# Patient Record
Sex: FEMALE | Race: WHITE | NOT HISPANIC OR LATINO | Employment: UNEMPLOYED | URBAN - NONMETROPOLITAN AREA
[De-identification: names, ages, dates, MRNs, and addresses within clinical notes are randomized per-mention and may not be internally consistent; named-entity substitution may affect disease eponyms.]

---

## 2023-03-06 ENCOUNTER — TELEPHONE (OUTPATIENT)
Dept: PRIMARY CARE | Facility: CLINIC | Age: 63
End: 2023-03-06

## 2023-03-06 DIAGNOSIS — I10 BENIGN HYPERTENSION: ICD-10-CM

## 2023-03-06 RX ORDER — VALSARTAN AND HYDROCHLOROTHIAZIDE 160; 12.5 MG/1; MG/1
1 TABLET, FILM COATED ORAL DAILY
COMMUNITY
Start: 2020-07-14 | End: 2023-03-29 | Stop reason: SDUPTHER

## 2023-03-06 RX ORDER — AMLODIPINE BESYLATE 10 MG/1
1 TABLET ORAL DAILY
COMMUNITY
Start: 2022-01-05 | End: 2023-03-06 | Stop reason: SDUPTHER

## 2023-03-06 RX ORDER — PANTOPRAZOLE SODIUM 40 MG/1
1 TABLET, DELAYED RELEASE ORAL DAILY
COMMUNITY
Start: 2015-09-03 | End: 2023-05-08 | Stop reason: SDUPTHER

## 2023-03-06 RX ORDER — SERTRALINE HYDROCHLORIDE 100 MG/1
TABLET, FILM COATED ORAL
COMMUNITY
Start: 2018-01-25 | End: 2023-07-12 | Stop reason: SDUPTHER

## 2023-03-06 RX ORDER — ASPIRIN 81 MG/1
1 TABLET ORAL DAILY
COMMUNITY
Start: 2022-01-05

## 2023-03-06 RX ORDER — CHOLECALCIFEROL (VITAMIN D3) 125 MCG
CAPSULE ORAL
COMMUNITY
Start: 2016-02-04

## 2023-03-06 RX ORDER — ROSUVASTATIN CALCIUM 40 MG/1
1 TABLET, COATED ORAL DAILY
COMMUNITY
Start: 2019-05-24 | End: 2023-03-29 | Stop reason: SDUPTHER

## 2023-03-06 RX ORDER — METOPROLOL SUCCINATE 25 MG/1
1 TABLET, EXTENDED RELEASE ORAL DAILY
COMMUNITY
Start: 2017-12-02 | End: 2023-05-08 | Stop reason: SDUPTHER

## 2023-03-06 RX ORDER — FLUTICASONE PROPIONATE 50 MCG
SPRAY, SUSPENSION (ML) NASAL
COMMUNITY
Start: 2019-09-04 | End: 2023-05-08 | Stop reason: SDUPTHER

## 2023-03-07 RX ORDER — AMLODIPINE BESYLATE 10 MG/1
10 TABLET ORAL DAILY
Qty: 30 TABLET | Refills: 2 | Status: SHIPPED | OUTPATIENT
Start: 2023-03-07 | End: 2023-07-12 | Stop reason: SDUPTHER

## 2023-03-29 DIAGNOSIS — E78.00 HYPERCHOLESTEROLEMIA: ICD-10-CM

## 2023-03-29 DIAGNOSIS — I10 BENIGN HYPERTENSION: Primary | ICD-10-CM

## 2023-03-29 RX ORDER — VALSARTAN AND HYDROCHLOROTHIAZIDE 160; 12.5 MG/1; MG/1
1 TABLET, FILM COATED ORAL DAILY
Qty: 90 TABLET | Refills: 0 | Status: SHIPPED | OUTPATIENT
Start: 2023-03-29 | End: 2023-07-12 | Stop reason: SDUPTHER

## 2023-03-29 RX ORDER — ROSUVASTATIN CALCIUM 40 MG/1
40 TABLET, COATED ORAL DAILY
Qty: 90 TABLET | Refills: 0 | Status: SHIPPED | OUTPATIENT
Start: 2023-03-29 | End: 2023-07-12 | Stop reason: SDUPTHER

## 2023-05-05 PROBLEM — I10 BENIGN ESSENTIAL HYPERTENSION: Status: ACTIVE | Noted: 2023-05-05

## 2023-05-05 PROBLEM — S99.929A FOOT TRAUMA: Status: ACTIVE | Noted: 2023-05-05

## 2023-05-05 PROBLEM — K64.8 INTERNAL HEMORRHOIDS: Status: ACTIVE | Noted: 2023-05-05

## 2023-05-05 PROBLEM — Z20.822 ENCOUNTER BY TELEHEALTH FOR SUSPECTED COVID-19: Status: ACTIVE | Noted: 2023-05-05

## 2023-05-05 PROBLEM — R94.31 ABNORMAL EKG: Status: ACTIVE | Noted: 2023-05-05

## 2023-05-05 PROBLEM — M19.90 OSTEOARTHRITIS: Status: ACTIVE | Noted: 2023-05-05

## 2023-05-05 PROBLEM — K21.9 GERD (GASTROESOPHAGEAL REFLUX DISEASE): Status: ACTIVE | Noted: 2023-05-05

## 2023-05-05 PROBLEM — I10 HTN (HYPERTENSION): Status: ACTIVE | Noted: 2023-05-05

## 2023-05-05 PROBLEM — K80.20 CHOLELITHIASIS: Status: ACTIVE | Noted: 2023-05-05

## 2023-05-05 PROBLEM — H92.02 LEFT EAR PAIN: Status: ACTIVE | Noted: 2023-05-05

## 2023-05-05 PROBLEM — K64.9 HEMORRHOIDS: Status: ACTIVE | Noted: 2023-05-05

## 2023-05-05 PROBLEM — N95.2 ATROPHIC VAGINITIS: Status: ACTIVE | Noted: 2023-05-05

## 2023-05-05 PROBLEM — I21.9 MYOCARDIAL INFARCTION (MULTI): Status: ACTIVE | Noted: 2023-05-05

## 2023-05-05 PROBLEM — J20.9 ACUTE BRONCHITIS: Status: ACTIVE | Noted: 2023-05-05

## 2023-05-05 PROBLEM — I25.10 CAD (CORONARY ARTERY DISEASE): Status: ACTIVE | Noted: 2023-05-05

## 2023-05-05 PROBLEM — E55.9 VITAMIN D DEFICIENCY: Status: ACTIVE | Noted: 2023-05-05

## 2023-05-05 PROBLEM — K57.32 DIVERTICULITIS OF COLON: Status: ACTIVE | Noted: 2023-05-05

## 2023-05-05 PROBLEM — D50.9 IRON DEFICIENCY ANEMIA: Status: ACTIVE | Noted: 2023-05-05

## 2023-05-05 PROBLEM — B37.31 VAGINAL CANDIDIASIS: Status: ACTIVE | Noted: 2023-05-05

## 2023-05-05 PROBLEM — F11.90 NARCOTIC DRUG USE: Status: ACTIVE | Noted: 2023-05-05

## 2023-05-05 PROBLEM — D50.0 IRON DEFICIENCY ANEMIA DUE TO CHRONIC BLOOD LOSS: Status: ACTIVE | Noted: 2023-05-05

## 2023-05-05 PROBLEM — M19.90 ARTHRITIS: Status: ACTIVE | Noted: 2023-05-05

## 2023-05-05 PROBLEM — U07.1 COVID-19 VIRUS INFECTION: Status: ACTIVE | Noted: 2023-05-05

## 2023-05-05 PROBLEM — H66.90 ACUTE OTITIS MEDIA: Status: ACTIVE | Noted: 2023-05-05

## 2023-05-05 PROBLEM — L30.9 DERMATITIS: Status: ACTIVE | Noted: 2023-05-05

## 2023-05-05 PROBLEM — R73.9 HYPERGLYCEMIA: Status: ACTIVE | Noted: 2023-05-05

## 2023-05-05 PROBLEM — F41.9 ANXIETY: Status: ACTIVE | Noted: 2023-05-05

## 2023-05-05 PROBLEM — J02.9 SORE THROAT: Status: ACTIVE | Noted: 2023-05-05

## 2023-05-05 PROBLEM — N95.1 HOT FLASH, MENOPAUSAL: Status: ACTIVE | Noted: 2023-05-05

## 2023-05-05 PROBLEM — M81.0 POST-MENOPAUSAL OSTEOPOROSIS: Status: ACTIVE | Noted: 2023-05-05

## 2023-05-05 PROBLEM — R73.03 BORDERLINE DIABETES: Status: ACTIVE | Noted: 2023-05-05

## 2023-05-05 PROBLEM — E83.42 HYPOMAGNESEMIA: Status: ACTIVE | Noted: 2023-05-05

## 2023-05-05 PROBLEM — J02.9 ACUTE PHARYNGITIS: Status: ACTIVE | Noted: 2023-05-05

## 2023-05-05 PROBLEM — K59.00 CONSTIPATION: Status: ACTIVE | Noted: 2023-05-05

## 2023-05-05 PROBLEM — J01.00 ACUTE MAXILLARY SINUSITIS: Status: ACTIVE | Noted: 2023-05-05

## 2023-05-05 PROBLEM — F32.A DEPRESSION: Status: ACTIVE | Noted: 2023-05-05

## 2023-05-05 PROBLEM — N94.10 UNSPECIFIED DYSPAREUNIA: Status: ACTIVE | Noted: 2023-05-05

## 2023-05-05 PROBLEM — J30.9 ALLERGIC RHINITIS: Status: ACTIVE | Noted: 2023-05-05

## 2023-05-05 PROBLEM — S92.919A TOE FRACTURE: Status: ACTIVE | Noted: 2023-05-05

## 2023-05-05 PROBLEM — E66.9 OBESITY (BMI 30-39.9): Status: ACTIVE | Noted: 2023-05-05

## 2023-05-05 PROBLEM — E78.00 HYPERCHOLESTEROLEMIA: Status: ACTIVE | Noted: 2023-05-05

## 2023-05-05 PROBLEM — J01.10 ACUTE FRONTAL SINUSITIS: Status: ACTIVE | Noted: 2023-05-05

## 2023-05-05 PROBLEM — L27.0 DRUG RASH: Status: ACTIVE | Noted: 2023-05-05

## 2023-05-08 ENCOUNTER — OFFICE VISIT (OUTPATIENT)
Dept: PRIMARY CARE | Facility: CLINIC | Age: 63
End: 2023-05-08

## 2023-05-08 VITALS
BODY MASS INDEX: 28.03 KG/M2 | WEIGHT: 138.8 LBS | HEART RATE: 68 BPM | OXYGEN SATURATION: 98 % | SYSTOLIC BLOOD PRESSURE: 120 MMHG | DIASTOLIC BLOOD PRESSURE: 68 MMHG

## 2023-05-08 DIAGNOSIS — J30.9 ALLERGIC RHINITIS, UNSPECIFIED SEASONALITY, UNSPECIFIED TRIGGER: ICD-10-CM

## 2023-05-08 DIAGNOSIS — I10 BENIGN ESSENTIAL HYPERTENSION: ICD-10-CM

## 2023-05-08 DIAGNOSIS — K21.9 GASTROESOPHAGEAL REFLUX DISEASE, UNSPECIFIED WHETHER ESOPHAGITIS PRESENT: ICD-10-CM

## 2023-05-08 DIAGNOSIS — J01.90 ACUTE NON-RECURRENT SINUSITIS, UNSPECIFIED LOCATION: Primary | ICD-10-CM

## 2023-05-08 DIAGNOSIS — F32.A DEPRESSION, UNSPECIFIED DEPRESSION TYPE: ICD-10-CM

## 2023-05-08 DIAGNOSIS — I25.10 CORONARY ARTERY DISEASE INVOLVING NATIVE CORONARY ARTERY OF NATIVE HEART WITHOUT ANGINA PECTORIS: ICD-10-CM

## 2023-05-08 PROCEDURE — 3078F DIAST BP <80 MM HG: CPT | Performed by: INTERNAL MEDICINE

## 2023-05-08 PROCEDURE — 3008F BODY MASS INDEX DOCD: CPT | Performed by: INTERNAL MEDICINE

## 2023-05-08 PROCEDURE — 1036F TOBACCO NON-USER: CPT | Performed by: INTERNAL MEDICINE

## 2023-05-08 PROCEDURE — 99214 OFFICE O/P EST MOD 30 MIN: CPT | Performed by: INTERNAL MEDICINE

## 2023-05-08 PROCEDURE — 3074F SYST BP LT 130 MM HG: CPT | Performed by: INTERNAL MEDICINE

## 2023-05-08 RX ORDER — METOPROLOL SUCCINATE 25 MG/1
25 TABLET, EXTENDED RELEASE ORAL DAILY
Qty: 90 TABLET | Refills: 0 | Status: SHIPPED | OUTPATIENT
Start: 2023-05-08 | End: 2023-08-08 | Stop reason: SDUPTHER

## 2023-05-08 RX ORDER — LORATADINE 10 MG/1
10 TABLET ORAL DAILY
COMMUNITY

## 2023-05-08 RX ORDER — FLUTICASONE PROPIONATE 50 MCG
1 SPRAY, SUSPENSION (ML) NASAL DAILY
Qty: 16 G | Refills: 2 | Status: SHIPPED | OUTPATIENT
Start: 2023-05-08 | End: 2023-11-01 | Stop reason: SDUPTHER

## 2023-05-08 RX ORDER — AMOXICILLIN AND CLAVULANATE POTASSIUM 875; 125 MG/1; MG/1
875 TABLET, FILM COATED ORAL 2 TIMES DAILY
Qty: 20 TABLET | Refills: 0 | Status: SHIPPED | OUTPATIENT
Start: 2023-05-08 | End: 2023-05-18

## 2023-05-08 RX ORDER — PANTOPRAZOLE SODIUM 40 MG/1
40 TABLET, DELAYED RELEASE ORAL DAILY
Qty: 90 TABLET | Refills: 0 | Status: SHIPPED | OUTPATIENT
Start: 2023-05-08 | End: 2023-08-08 | Stop reason: SDUPTHER

## 2023-05-08 NOTE — PROGRESS NOTES
Subjective   Patient ID: Livia Jordan is a 62 y.o. female who presents for Hypertension, Anxiety, GERD, Hyperlipidemia, and Earache (Left ear pressure, blocked, headache, cough, sinus drainage x 1 1/2 wks).  HPI      Patient here for sick visit.      She complained of sinus pressure with discolored drainage as well as left ear pressure and       feeling blocked with a headache and cough x1-1/2 weeks.  She denied fevers or chills.        She denied shortness of breath and chest pain.     depression stable on rx no side effects   In the process of  from her spouse and moving in with her daughter.     hypertension stable on rx no side effects     h/o gallstones occasional pain     Irritated hemorrhoids and bloating. better on rx     MI,S/P PTCA with stent, arrhythmia / history of cardiac arrest stable on rx  sees cardio     arthritis needs tramadol prn     cholesterol stable on rx no side effects     GERD stable on rx no side effects     mammogram, dexa not done yet  labs ordered not done     GYN on follow up    Review of Systems   All other systems reviewed and are negative.      Objective   /68   Pulse 68   Wt 63 kg (138 lb 12.8 oz)   SpO2 98%   BMI 28.03 kg/m²   Lab Results   Component Value Date    WBC 8.1 12/11/2017    HGB 13.8 12/11/2017    HCT 39.0 12/11/2017     12/11/2017    CHOL 168 01/22/2018    TRIG 118 01/22/2018    HDL 57.3 01/22/2018    ALT 39 01/22/2018    AST 22 01/22/2018     01/22/2018    K 4.1 01/22/2018     01/22/2018    CREATININE 0.73 01/22/2018    BUN 21 01/22/2018    CO2 33 (H) 01/22/2018    INR 1.0 11/30/2017           Physical Exam  Vitals reviewed.   Constitutional:       Appearance: Normal appearance. She is normal weight.   HENT:      Head: Normocephalic and atraumatic.      Right Ear: Tympanic membrane normal.      Left Ear: Tympanic membrane normal.      Mouth/Throat:      Pharynx: No posterior oropharyngeal erythema.   Eyes:      General: No  scleral icterus.     Conjunctiva/sclera: Conjunctivae normal.      Pupils: Pupils are equal, round, and reactive to light.   Cardiovascular:      Rate and Rhythm: Normal rate and regular rhythm.      Heart sounds: Normal heart sounds.   Pulmonary:      Effort: Pulmonary effort is normal. No respiratory distress.      Breath sounds: No wheezing.   Abdominal:      General: Abdomen is flat. Bowel sounds are normal. There is no distension.      Palpations: Abdomen is soft. There is no mass.      Tenderness: There is no abdominal tenderness. There is no rebound.   Musculoskeletal:         General: Normal range of motion.      Cervical back: Normal range of motion and neck supple.   Skin:     General: Skin is warm and dry.   Neurological:      General: No focal deficit present.      Mental Status: She is alert and oriented to person, place, and time. Mental status is at baseline.   Psychiatric:         Mood and Affect: Mood normal.         Behavior: Behavior normal.         Thought Content: Thought content normal.         Judgment: Judgment normal.         Problem List Items Addressed This Visit          Circulatory    Benign essential hypertension    Relevant Medications    metoprolol succinate XL (Toprol-XL) 25 mg 24 hr tablet    CAD (coronary artery disease)    Relevant Medications    metoprolol succinate XL (Toprol-XL) 25 mg 24 hr tablet       Digestive    GERD (gastroesophageal reflux disease)    Relevant Medications    pantoprazole (ProtoNix) 40 mg EC tablet       Other    Allergic rhinitis    Relevant Medications    fluticasone (Flonase) 50 mcg/actuation nasal spray    Depression     Other Visit Diagnoses       Acute non-recurrent sinusitis, unspecified location    -  Primary    Relevant Medications    amoxicillin-pot clavulanate (Augmentin) 875-125 mg tablet    BMI 28.0-28.9,adult              Assessment/Plan       She complained of sinus pressure with discolored drainage as well as left ear pressure and        feeling blocked with a headache and cough x1-1/2 weeks.  She denied fevers or chills.        She denied shortness of breath and chest pain.    Acute sinusitis with  eustachian tube dysfunction  Augmentin 875 mg twice daily #20 no refill risk/benefits reviewed.  Flonase as directed as needed  Rest increase hydration recommended.    depression stable on rx no side effects   In the process of  from her spouse and moving in with her daughter.    hypertension stable on rx no side effects     h/o gallstones occasional pain     Irritated hemorrhoids and bloating. better on rx     MI,S/P PTCA with stent, arrhythmia / history of cardiac arrest stable on rx  sees cardio     arthritis needs tramadol prn     cholesterol stable on rx no side effects     GERD stable on rx no side effects    Diet and exercise reviewed     do mammogram, dexa not done yet  labs ordered     GYN on follow up    Follow-up 3 months

## 2023-06-14 ENCOUNTER — OFFICE VISIT (OUTPATIENT)
Dept: PRIMARY CARE | Facility: CLINIC | Age: 63
End: 2023-06-14

## 2023-06-14 VITALS
HEART RATE: 57 BPM | OXYGEN SATURATION: 97 % | WEIGHT: 134.6 LBS | TEMPERATURE: 96.7 F | DIASTOLIC BLOOD PRESSURE: 78 MMHG | SYSTOLIC BLOOD PRESSURE: 122 MMHG | BODY MASS INDEX: 27.19 KG/M2

## 2023-06-14 DIAGNOSIS — I10 BENIGN ESSENTIAL HYPERTENSION: ICD-10-CM

## 2023-06-14 DIAGNOSIS — J01.91 ACUTE RECURRENT SINUSITIS, UNSPECIFIED LOCATION: Primary | ICD-10-CM

## 2023-06-14 DIAGNOSIS — B37.31 CANDIDAL VULVOVAGINITIS: ICD-10-CM

## 2023-06-14 DIAGNOSIS — E78.00 HYPERCHOLESTEROLEMIA: ICD-10-CM

## 2023-06-14 DIAGNOSIS — K21.9 GASTROESOPHAGEAL REFLUX DISEASE WITHOUT ESOPHAGITIS: ICD-10-CM

## 2023-06-14 DIAGNOSIS — F32.A DEPRESSION, UNSPECIFIED DEPRESSION TYPE: ICD-10-CM

## 2023-06-14 DIAGNOSIS — I25.10 CORONARY ARTERY DISEASE INVOLVING NATIVE CORONARY ARTERY OF NATIVE HEART WITHOUT ANGINA PECTORIS: ICD-10-CM

## 2023-06-14 PROCEDURE — 99214 OFFICE O/P EST MOD 30 MIN: CPT | Performed by: INTERNAL MEDICINE

## 2023-06-14 PROCEDURE — 3078F DIAST BP <80 MM HG: CPT | Performed by: INTERNAL MEDICINE

## 2023-06-14 PROCEDURE — 3074F SYST BP LT 130 MM HG: CPT | Performed by: INTERNAL MEDICINE

## 2023-06-14 PROCEDURE — 1036F TOBACCO NON-USER: CPT | Performed by: INTERNAL MEDICINE

## 2023-06-14 PROCEDURE — 3008F BODY MASS INDEX DOCD: CPT | Performed by: INTERNAL MEDICINE

## 2023-06-14 RX ORDER — DOXYCYCLINE HYCLATE 100 MG
100 TABLET ORAL 2 TIMES DAILY
Qty: 20 TABLET | Refills: 0 | Status: SHIPPED | OUTPATIENT
Start: 2023-06-14 | End: 2023-06-24

## 2023-06-14 RX ORDER — FLUCONAZOLE 150 MG/1
150 TABLET ORAL ONCE
Qty: 1 TABLET | Refills: 0 | Status: SHIPPED | OUTPATIENT
Start: 2023-06-14 | End: 2023-06-14

## 2023-06-14 NOTE — PROGRESS NOTES
Subjective   Patient ID: Livia Jordan is a 62 y.o. female who presents for Sinus Problem, Pressure Behind the Eyes, Earache, Sore Throat (raspy), and Fatigue.  HPI    Acute visit    Patient came in today complaining of sinus pressure particularly behind the eyes along with earache sore throat and fatigue.  She denies any fevers or chills.  She denies any cough as well as shortness of breath.  This has been going on for some time now.  She has used over-the-counter medications with no significant improvement.  She states that following her last infection she did well for a week before her symptoms reoccurred.    depression stable on rx no side effects    from her spouse and moved in with her daughter.     hypertension stable on rx no side effects     h/o gallstones occasional pain     Irritated hemorrhoids and bloating. better on rx     MI,S/P PTCA with stent, arrhythmia / history of cardiac arrest stable on rx  sees cardio     arthritis needs tramadol prn     cholesterol stable on rx no side effects     GERD stable on rx no side effects     Diet and exercise reviewed     do mammogram, dexa not done yet  labs ordered     GYN on follow up    Review of Systems   All other systems reviewed and are negative.      Objective   /78   Pulse 57   Temp 35.9 °C (96.7 °F)   Wt 61.1 kg (134 lb 9.6 oz)   SpO2 97%   BMI 27.19 kg/m²   Lab Results   Component Value Date    WBC 8.1 12/11/2017    HGB 13.8 12/11/2017    HCT 39.0 12/11/2017     12/11/2017    CHOL 168 01/22/2018    TRIG 118 01/22/2018    HDL 57.3 01/22/2018    ALT 39 01/22/2018    AST 22 01/22/2018     01/22/2018    K 4.1 01/22/2018     01/22/2018    CREATININE 0.73 01/22/2018    BUN 21 01/22/2018    CO2 33 (H) 01/22/2018    INR 1.0 11/30/2017           Physical Exam  Vitals reviewed.   Constitutional:       Appearance: Normal appearance. She is normal weight.   HENT:      Head: Normocephalic and atraumatic.      Ears:       Comments: B/l ears red     Mouth/Throat:      Pharynx: No posterior oropharyngeal erythema.   Eyes:      General: No scleral icterus.     Conjunctiva/sclera: Conjunctivae normal.      Pupils: Pupils are equal, round, and reactive to light.   Cardiovascular:      Rate and Rhythm: Normal rate and regular rhythm.      Heart sounds: Normal heart sounds.   Pulmonary:      Effort: No respiratory distress.      Breath sounds: No wheezing.   Abdominal:      General: Abdomen is flat. Bowel sounds are normal. There is no distension.      Palpations: Abdomen is soft. There is no mass.      Tenderness: There is no abdominal tenderness. There is no rebound.   Musculoskeletal:         General: Normal range of motion.      Cervical back: Normal range of motion and neck supple.   Skin:     General: Skin is warm and dry.   Neurological:      General: No focal deficit present.      Mental Status: She is alert and oriented to person, place, and time. Mental status is at baseline.   Psychiatric:         Mood and Affect: Mood normal.         Behavior: Behavior normal.         Thought Content: Thought content normal.         Judgment: Judgment normal.         Problem List Items Addressed This Visit          Circulatory    Benign essential hypertension    CAD (coronary artery disease)       Digestive    GERD (gastroesophageal reflux disease)       Other    Depression    Hypercholesterolemia     Other Visit Diagnoses       Acute recurrent sinusitis, unspecified location    -  Primary    Relevant Medications    doxycycline (Vibra-Tabs) 100 mg tablet    Candidal vulvovaginitis        Relevant Medications    fluconazole (Diflucan) 150 mg tablet    BMI 27.0-27.9,adult              Assessment/Plan       Patient came in today complaining of sinus pressure particularly behind the eyes along with earache sore throat and fatigue.  She denies any fevers or chills.  She denies any cough as well as shortness of breath.  This has been going on for some  time now.  She has used over-the-counter medications with no significant improvement.  She states that following her last infection she did well for a week before her symptoms reoccurred.  Acute sinusitis recurrent  Doxycycline 100 mg twice daily #20 no refills  Risk / benefits reviewed  Rest increase fluids  CT sinus recommended due to insurance issues at this time patient declined  Diflucan in case needed    depression stable on rx no side effects    from her spouse and moved in with her daughter.     hypertension stable on rx no side effects     h/o gallstones occasional pain     Irritated hemorrhoids and bloating. better on rx     MI,S/P PTCA with stent, arrhythmia / history of cardiac arrest stable on rx  sees cardio     arthritis needs tramadol prn     cholesterol stable on rx no side effects     GERD stable on rx no side effects     Diet and exercise reviewed     do mammogram, dexa not done yet  labs ordered     GYN on follow up    Follow up pending / 1 month

## 2023-07-12 ENCOUNTER — OFFICE VISIT (OUTPATIENT)
Dept: PRIMARY CARE | Facility: CLINIC | Age: 63
End: 2023-07-12
Payer: COMMERCIAL

## 2023-07-12 VITALS
OXYGEN SATURATION: 95 % | DIASTOLIC BLOOD PRESSURE: 78 MMHG | HEART RATE: 73 BPM | WEIGHT: 138.2 LBS | BODY MASS INDEX: 27.91 KG/M2 | SYSTOLIC BLOOD PRESSURE: 142 MMHG

## 2023-07-12 DIAGNOSIS — I10 BENIGN HYPERTENSION: ICD-10-CM

## 2023-07-12 DIAGNOSIS — I25.10 CORONARY ARTERY DISEASE INVOLVING NATIVE CORONARY ARTERY OF NATIVE HEART WITHOUT ANGINA PECTORIS: ICD-10-CM

## 2023-07-12 DIAGNOSIS — J32.9 CHRONIC SINUSITIS, UNSPECIFIED LOCATION: Primary | ICD-10-CM

## 2023-07-12 DIAGNOSIS — F32.A DEPRESSION, UNSPECIFIED DEPRESSION TYPE: ICD-10-CM

## 2023-07-12 DIAGNOSIS — R73.9 HYPERGLYCEMIA: ICD-10-CM

## 2023-07-12 DIAGNOSIS — E78.00 HYPERCHOLESTEROLEMIA: ICD-10-CM

## 2023-07-12 PROCEDURE — 3077F SYST BP >= 140 MM HG: CPT | Performed by: INTERNAL MEDICINE

## 2023-07-12 PROCEDURE — 1036F TOBACCO NON-USER: CPT | Performed by: INTERNAL MEDICINE

## 2023-07-12 PROCEDURE — 3078F DIAST BP <80 MM HG: CPT | Performed by: INTERNAL MEDICINE

## 2023-07-12 PROCEDURE — 99214 OFFICE O/P EST MOD 30 MIN: CPT | Performed by: INTERNAL MEDICINE

## 2023-07-12 PROCEDURE — 3008F BODY MASS INDEX DOCD: CPT | Performed by: INTERNAL MEDICINE

## 2023-07-12 RX ORDER — VALSARTAN AND HYDROCHLOROTHIAZIDE 160; 12.5 MG/1; MG/1
1 TABLET, FILM COATED ORAL DAILY
Qty: 90 TABLET | Refills: 0 | Status: SHIPPED | OUTPATIENT
Start: 2023-07-12 | End: 2023-10-13 | Stop reason: SDUPTHER

## 2023-07-12 RX ORDER — SERTRALINE HYDROCHLORIDE 100 MG/1
100 TABLET, FILM COATED ORAL DAILY
Qty: 90 TABLET | Refills: 0 | Status: SHIPPED | OUTPATIENT
Start: 2023-07-12 | End: 2023-10-13 | Stop reason: SDUPTHER

## 2023-07-12 RX ORDER — ROSUVASTATIN CALCIUM 40 MG/1
40 TABLET, COATED ORAL DAILY
Qty: 90 TABLET | Refills: 0 | Status: SHIPPED | OUTPATIENT
Start: 2023-07-12 | End: 2023-10-13 | Stop reason: SDUPTHER

## 2023-07-12 RX ORDER — AMLODIPINE BESYLATE 10 MG/1
10 TABLET ORAL DAILY
Qty: 90 TABLET | Refills: 0 | Status: SHIPPED | OUTPATIENT
Start: 2023-07-12 | End: 2023-10-13 | Stop reason: SDUPTHER

## 2023-07-12 NOTE — PROGRESS NOTES
Subjective   Patient ID: Livia Jordan is a 62 y.o. female who presents for Ear Problem (Infection not getting better), Headache, Nasal Congestion, and Earache (Both ears hurt, pressure, pressure).  Headache     Earache        Sick visit     Patient was treated for a sinus infection approximately a month ago.  She had relief      following  the antibiotics for 2 days and then symptoms reoccurred.  She now has headaches earaches and nasal drainage.  She is on Claritin and Flonase as directed.  She denies any discolored drainage.  She denies any fevers or chills.     depression stable on rx no side effects    from her spouse and moved in with her daughter.     hypertension stable on rx no side effects    Hyperglycemia on diet     h/o gallstones occasional pain     Irritated hemorrhoids and bloating. better on rx     MI,S/P PTCA with stent, arrhythmia / history of cardiac arrest stable on rx  sees cardio     arthritis needs tramadol prn     cholesterol stable on rx no side effects     GERD stable on rx no side effects     Diet and exercise reviewed     do mammogram, dexa not done yet  labs ordered     GYN on follow up    Review of Systems   All other systems reviewed and are negative.      Objective   /78   Pulse 73   Wt 62.7 kg (138 lb 3.2 oz)   SpO2 95%   BMI 27.91 kg/m²   Lab Results   Component Value Date    WBC 8.1 12/11/2017    HGB 13.8 12/11/2017    HCT 39.0 12/11/2017     12/11/2017    CHOL 168 01/22/2018    TRIG 118 01/22/2018    HDL 57.3 01/22/2018    ALT 39 01/22/2018    AST 22 01/22/2018     01/22/2018    K 4.1 01/22/2018     01/22/2018    CREATININE 0.73 01/22/2018    BUN 21 01/22/2018    CO2 33 (H) 01/22/2018    INR 1.0 11/30/2017           Physical Exam  Vitals reviewed.   Constitutional:       Appearance: Normal appearance. She is normal weight.   HENT:      Head: Normocephalic and atraumatic.      Ears:      Comments: Tm's red b/l     Mouth/Throat:       Pharynx: No posterior oropharyngeal erythema.   Eyes:      General: No scleral icterus.     Conjunctiva/sclera: Conjunctivae normal.      Pupils: Pupils are equal, round, and reactive to light.   Cardiovascular:      Rate and Rhythm: Normal rate and regular rhythm.      Heart sounds: Normal heart sounds.   Pulmonary:      Effort: No respiratory distress.      Breath sounds: No wheezing.   Abdominal:      General: Abdomen is flat. Bowel sounds are normal. There is no distension.      Palpations: Abdomen is soft. There is no mass.      Tenderness: There is no abdominal tenderness. There is no rebound.   Musculoskeletal:         General: Normal range of motion.      Cervical back: Normal range of motion and neck supple.   Skin:     General: Skin is warm and dry.   Neurological:      General: No focal deficit present.      Mental Status: She is alert and oriented to person, place, and time. Mental status is at baseline.   Psychiatric:         Mood and Affect: Mood normal.         Behavior: Behavior normal.         Thought Content: Thought content normal.         Judgment: Judgment normal.         Problem List Items Addressed This Visit          Cardiac and Vasculature    CAD (coronary artery disease)    Relevant Medications    amLODIPine (Norvasc) 10 mg tablet    Hypercholesterolemia    Relevant Medications    rosuvastatin (Crestor) 40 mg tablet       Endocrine/Metabolic    Hyperglycemia    Relevant Orders    Hemoglobin A1c       Mental Health    Depression    Relevant Medications    sertraline (Zoloft) 100 mg tablet     Other Visit Diagnoses       Chronic sinusitis, unspecified location    -  Primary    Relevant Orders    CT sinus wo IV contrast    Benign hypertension        Relevant Medications    amLODIPine (Norvasc) 10 mg tablet    valsartan-hydrochlorothiazide (Diovan-HCT) 160-12.5 mg tablet    BMI 27.0-27.9,adult              Assessment/Plan      Patient was treated for a sinus infection approximately a month ago.   She had relief      following  the antibiotics for 2 days and then symptoms reoccurred.  She now has headaches earaches and nasal drainage.  She is on Claritin and Flonase as directed.  She denies any discolored drainage.  She denies any fevers or chills.  Chronic sinusitis  Check CT sinuses for further management.  Change Claritin to Zyrtec and continue Flonase as directed.     depression stable on rx no side effects    from her spouse and moved in with her daughter.     hypertension stable on rx no side effects  Limit NSAIDs  Follow BP closely    Hyperglycemia on diet  Check HbA1c     h/o gallstones occasional pain     Irritated hemorrhoids and bloating. better on rx     MI,S/P PTCA with stent, arrhythmia / history of cardiac arrest stable on rx  sees cardio     arthritis needs tramadol prn     cholesterol stable on rx no side effects     GERD stable on rx no side effects     Diet and exercise reviewed     do mammogram, dexa not done yet  labs ordered     GYN on follow up    Follow-up scheduled

## 2023-08-08 ENCOUNTER — OFFICE VISIT (OUTPATIENT)
Dept: PRIMARY CARE | Facility: CLINIC | Age: 63
End: 2023-08-08
Payer: COMMERCIAL

## 2023-08-08 VITALS
HEART RATE: 87 BPM | SYSTOLIC BLOOD PRESSURE: 124 MMHG | WEIGHT: 138.4 LBS | OXYGEN SATURATION: 95 % | DIASTOLIC BLOOD PRESSURE: 76 MMHG | BODY MASS INDEX: 27.95 KG/M2

## 2023-08-08 DIAGNOSIS — I25.10 CORONARY ARTERY DISEASE INVOLVING NATIVE CORONARY ARTERY OF NATIVE HEART WITHOUT ANGINA PECTORIS: ICD-10-CM

## 2023-08-08 DIAGNOSIS — K21.9 GASTROESOPHAGEAL REFLUX DISEASE, UNSPECIFIED WHETHER ESOPHAGITIS PRESENT: ICD-10-CM

## 2023-08-08 DIAGNOSIS — J30.9 ALLERGIC RHINITIS, UNSPECIFIED SEASONALITY, UNSPECIFIED TRIGGER: ICD-10-CM

## 2023-08-08 DIAGNOSIS — R73.9 HYPERGLYCEMIA: Primary | ICD-10-CM

## 2023-08-08 DIAGNOSIS — I10 BENIGN ESSENTIAL HYPERTENSION: ICD-10-CM

## 2023-08-08 PROCEDURE — 1036F TOBACCO NON-USER: CPT | Performed by: INTERNAL MEDICINE

## 2023-08-08 PROCEDURE — 3074F SYST BP LT 130 MM HG: CPT | Performed by: INTERNAL MEDICINE

## 2023-08-08 PROCEDURE — 3078F DIAST BP <80 MM HG: CPT | Performed by: INTERNAL MEDICINE

## 2023-08-08 PROCEDURE — 3008F BODY MASS INDEX DOCD: CPT | Performed by: INTERNAL MEDICINE

## 2023-08-08 PROCEDURE — 99214 OFFICE O/P EST MOD 30 MIN: CPT | Performed by: INTERNAL MEDICINE

## 2023-08-08 RX ORDER — PANTOPRAZOLE SODIUM 40 MG/1
40 TABLET, DELAYED RELEASE ORAL DAILY
Qty: 90 TABLET | Refills: 0 | Status: SHIPPED | OUTPATIENT
Start: 2023-08-08 | End: 2023-11-30 | Stop reason: SDUPTHER

## 2023-08-08 RX ORDER — METOPROLOL SUCCINATE 25 MG/1
25 TABLET, EXTENDED RELEASE ORAL DAILY
Qty: 90 TABLET | Refills: 0 | Status: SHIPPED | OUTPATIENT
Start: 2023-08-08 | End: 2023-11-30 | Stop reason: SDUPTHER

## 2023-08-08 ASSESSMENT — ENCOUNTER SYMPTOMS: HYPERTENSION: 1

## 2023-08-08 NOTE — PROGRESS NOTES
"Subjective   Patient ID: Livia Jordan is a 62 y.o. female who presents for Med Management, Hypertension, and Hyperlipidemia.  Hypertension    Hyperlipidemia      Routine follow up    Lab and CT rev'd    earaches and nasal drainage.  She is on Claritin and Flonase as directed.  She denies any discolored drainage.  She denies any fevers or chills.  Allergic rhinitis  CT sinuses negative  Change Claritin to Zyrtec felt \"high\"  went back to claritin  continue Flonase as directed.     depression stable on rx no side effects    from her spouse and moved in with her daughter.     hypertension stable on rx no side effects  Limit NSAIDs  Follow BP closely     Hyperglycemia on diet  HbA1c 5.6     h/o gallstones occasional pain     Irritated hemorrhoids and bloating. better on rx     MI,S/P PTCA with stent, arrhythmia / history of cardiac arrest stable on rx  sees cardio     arthritis needs tramadol prn     cholesterol stable on rx no side effects     GERD stable on rx no side effects     Diet and exercise reviewed     do mammogram, dexa not done yet    GYN on follow up    Review of Systems   All other systems reviewed and are negative.      Objective   /76   Pulse 87   Wt 62.8 kg (138 lb 6.4 oz)   SpO2 95%   BMI 27.95 kg/m²   Lab Results   Component Value Date    WBC 8.1 12/11/2017    HGB 13.8 12/11/2017    HCT 39.0 12/11/2017     12/11/2017    CHOL 168 01/22/2018    TRIG 118 01/22/2018    HDL 57.3 01/22/2018    ALT 39 01/22/2018    AST 22 01/22/2018     01/22/2018    K 4.1 01/22/2018     01/22/2018    CREATININE 0.73 01/22/2018    BUN 21 01/22/2018    CO2 33 (H) 01/22/2018    INR 1.0 11/30/2017           Physical Exam  Vitals reviewed.   Constitutional:       Appearance: Normal appearance. She is normal weight.   HENT:      Head: Normocephalic and atraumatic.      Mouth/Throat:      Pharynx: No posterior oropharyngeal erythema.   Eyes:      General: No scleral icterus.     " "Conjunctiva/sclera: Conjunctivae normal.      Pupils: Pupils are equal, round, and reactive to light.   Cardiovascular:      Rate and Rhythm: Normal rate and regular rhythm.      Heart sounds: Normal heart sounds.   Pulmonary:      Effort: No respiratory distress.      Breath sounds: No wheezing.   Abdominal:      General: Abdomen is flat. Bowel sounds are normal. There is no distension.      Palpations: Abdomen is soft. There is no mass.      Tenderness: There is no abdominal tenderness. There is no rebound.   Musculoskeletal:         General: Normal range of motion.      Cervical back: Normal range of motion and neck supple.   Skin:     General: Skin is warm and dry.   Neurological:      General: No focal deficit present.      Mental Status: She is alert and oriented to person, place, and time. Mental status is at baseline.   Psychiatric:         Mood and Affect: Mood normal.         Behavior: Behavior normal.         Thought Content: Thought content normal.         Judgment: Judgment normal.         Problem List Items Addressed This Visit          Cardiac and Vasculature    Benign essential hypertension    Relevant Medications    metoprolol succinate XL (Toprol-XL) 25 mg 24 hr tablet    CAD (coronary artery disease)    Relevant Medications    metoprolol succinate XL (Toprol-XL) 25 mg 24 hr tablet       ENT    Allergic rhinitis       Endocrine/Metabolic    Hyperglycemia - Primary       Gastrointestinal and Abdominal    GERD (gastroesophageal reflux disease)    Relevant Medications    pantoprazole (ProtoNix) 40 mg EC tablet     Other Visit Diagnoses       BMI 27.0-27.9,adult              Assessment/Plan     Lab and CT rev'd    earaches and nasal drainage.  She is on Claritin and Flonase as directed.  She denies any discolored drainage.  She denies any fevers or chills.  Allergic rhinitis  CT sinuses negative  Change Claritin to Zyrtec felt \"high\"  went back to claritin  continue Flonase as directed.     depression " stable on rx no side effects    from her spouse and moved in with her daughter.     hypertension stable on rx no side effects  Limit NSAIDs  Follow BP closely     Hyperglycemia on diet  HbA1c 5.6     h/o gallstones occasional pain     Irritated hemorrhoids and bloating. better on rx     MI,S/P PTCA with stent, arrhythmia / history of cardiac arrest stable on rx  sees cardio     arthritis needs tramadol prn     cholesterol stable on rx no side effects     GERD stable on rx no side effects     Diet and exercise reviewed     do mammogram, dexa not done yet    GYN on follow up    Follow up 3 months

## 2023-10-13 DIAGNOSIS — F32.A DEPRESSION, UNSPECIFIED DEPRESSION TYPE: ICD-10-CM

## 2023-10-13 DIAGNOSIS — I10 BENIGN HYPERTENSION: ICD-10-CM

## 2023-10-13 DIAGNOSIS — E78.00 HYPERCHOLESTEROLEMIA: ICD-10-CM

## 2023-10-13 RX ORDER — ROSUVASTATIN CALCIUM 40 MG/1
40 TABLET, COATED ORAL DAILY
Qty: 90 TABLET | Refills: 0 | Status: SHIPPED | OUTPATIENT
Start: 2023-10-13 | End: 2024-01-16 | Stop reason: SDUPTHER

## 2023-10-13 RX ORDER — AMLODIPINE BESYLATE 10 MG/1
10 TABLET ORAL DAILY
Qty: 90 TABLET | Refills: 0 | Status: SHIPPED | OUTPATIENT
Start: 2023-10-13 | End: 2024-01-16 | Stop reason: SDUPTHER

## 2023-10-13 RX ORDER — VALSARTAN AND HYDROCHLOROTHIAZIDE 160; 12.5 MG/1; MG/1
1 TABLET, FILM COATED ORAL DAILY
Qty: 90 TABLET | Refills: 0 | Status: SHIPPED | OUTPATIENT
Start: 2023-10-13 | End: 2024-01-16 | Stop reason: SDUPTHER

## 2023-10-13 RX ORDER — SERTRALINE HYDROCHLORIDE 100 MG/1
100 TABLET, FILM COATED ORAL DAILY
Qty: 90 TABLET | Refills: 0 | Status: SHIPPED | OUTPATIENT
Start: 2023-10-13 | End: 2024-01-16 | Stop reason: SDUPTHER

## 2023-11-01 ENCOUNTER — OFFICE VISIT (OUTPATIENT)
Dept: PRIMARY CARE | Facility: CLINIC | Age: 63
End: 2023-11-01
Payer: COMMERCIAL

## 2023-11-01 VITALS
OXYGEN SATURATION: 98 % | SYSTOLIC BLOOD PRESSURE: 118 MMHG | WEIGHT: 145.4 LBS | DIASTOLIC BLOOD PRESSURE: 68 MMHG | HEART RATE: 81 BPM | BODY MASS INDEX: 29.37 KG/M2

## 2023-11-01 DIAGNOSIS — F32.A DEPRESSION, UNSPECIFIED DEPRESSION TYPE: ICD-10-CM

## 2023-11-01 DIAGNOSIS — J30.9 ALLERGIC RHINITIS, UNSPECIFIED SEASONALITY, UNSPECIFIED TRIGGER: ICD-10-CM

## 2023-11-01 DIAGNOSIS — I25.10 CORONARY ARTERY DISEASE INVOLVING NATIVE CORONARY ARTERY OF NATIVE HEART WITHOUT ANGINA PECTORIS: ICD-10-CM

## 2023-11-01 DIAGNOSIS — J01.11 ACUTE RECURRENT FRONTAL SINUSITIS: Primary | ICD-10-CM

## 2023-11-01 DIAGNOSIS — I10 BENIGN ESSENTIAL HYPERTENSION: ICD-10-CM

## 2023-11-01 DIAGNOSIS — R73.9 HYPERGLYCEMIA: ICD-10-CM

## 2023-11-01 PROBLEM — E11.3293 TYPE 2 DIABETES MELLITUS WITH BOTH EYES AFFECTED BY MILD NONPROLIFERATIVE RETINOPATHY WITHOUT MACULAR EDEMA, WITHOUT LONG-TERM CURRENT USE OF INSULIN (MULTI): Status: ACTIVE | Noted: 2023-11-01

## 2023-11-01 PROCEDURE — 1036F TOBACCO NON-USER: CPT | Performed by: INTERNAL MEDICINE

## 2023-11-01 PROCEDURE — 3008F BODY MASS INDEX DOCD: CPT | Performed by: INTERNAL MEDICINE

## 2023-11-01 PROCEDURE — 3074F SYST BP LT 130 MM HG: CPT | Performed by: INTERNAL MEDICINE

## 2023-11-01 PROCEDURE — 99214 OFFICE O/P EST MOD 30 MIN: CPT | Performed by: INTERNAL MEDICINE

## 2023-11-01 PROCEDURE — 3078F DIAST BP <80 MM HG: CPT | Performed by: INTERNAL MEDICINE

## 2023-11-01 RX ORDER — FLUTICASONE PROPIONATE 50 MCG
1 SPRAY, SUSPENSION (ML) NASAL DAILY
Qty: 16 G | Refills: 2 | Status: SHIPPED | OUTPATIENT
Start: 2023-11-01 | End: 2024-01-16 | Stop reason: SDUPTHER

## 2023-11-01 RX ORDER — METHYLPREDNISOLONE 4 MG/1
TABLET ORAL
Qty: 21 TABLET | Refills: 0 | Status: SHIPPED | OUTPATIENT
Start: 2023-11-01 | End: 2023-11-08

## 2023-11-01 RX ORDER — METHYLPREDNISOLONE 4 MG/1
TABLET ORAL
Qty: 21 TABLET | Refills: 0 | Status: SHIPPED | OUTPATIENT
Start: 2023-11-01 | End: 2023-11-01 | Stop reason: ENTERED-IN-ERROR

## 2023-11-01 ASSESSMENT — ENCOUNTER SYMPTOMS: HEADACHES: 1

## 2023-11-01 NOTE — PROGRESS NOTES
"Subjective   Patient ID: Livia Jordan is a 63 y.o. female who presents for Earache (Since April 2023/Bilateral-  Rt side is worse/Crackling), Headache, and sinus pressure.  Earache   Associated symptoms include headaches.   Headache   Associated symptoms include ear pain.     Sick appt    earaches and no nasal drainage. Frontal sinus pressure x 7 months She is on Claritin and Flonase as directed.   She denies any fevers or chills.  Allergic rhinitis  CT sinuses negative  Change Claritin to Zyrtec felt \"high\"  went back to claritin  continue Flonase as directed.  Medrol dose janice as directed  Risk / benefits rev'd  ENT consult     depression stable on rx no side effects    from her spouse and moved in with her daughter.     hypertension stable on rx no side effects  Limit NSAIDs  Follow BP closely     Hyperglycemia on diet  HbA1c 5.6     h/o gallstones occasional pain     Irritated hemorrhoids and bloating. better on rx     MI,S/P PTCA with stent, arrhythmia / history of cardiac arrest stable on rx  sees cardio     arthritis needs tramadol prn     cholesterol stable on rx no side effects     GERD stable on rx no side effects     Diet and exercise reviewed     do mammogram, dexa not done yet     GYN on follow up     Review of Systems   HENT:  Positive for ear pain.    Neurological:  Positive for headaches.   All other systems reviewed and are negative.      Objective   /68   Pulse 81   Wt 66 kg (145 lb 6.4 oz)   SpO2 98%   BMI 29.37 kg/m²   Lab Results   Component Value Date    WBC 8.1 12/11/2017    HGB 13.8 12/11/2017    HCT 39.0 12/11/2017     12/11/2017    CHOL 168 01/22/2018    TRIG 118 01/22/2018    HDL 57.3 01/22/2018    ALT 39 01/22/2018    AST 22 01/22/2018     01/22/2018    K 4.1 01/22/2018     01/22/2018    CREATININE 0.73 01/22/2018    BUN 21 01/22/2018    CO2 33 (H) 01/22/2018    INR 1.0 11/30/2017           Physical Exam  Vitals reviewed.   Constitutional:       " Appearance: Normal appearance. She is normal weight.   HENT:      Head: Normocephalic and atraumatic.      Right Ear: Tympanic membrane normal.      Left Ear: Tympanic membrane normal.      Mouth/Throat:      Pharynx: No posterior oropharyngeal erythema.   Eyes:      General: No scleral icterus.     Conjunctiva/sclera: Conjunctivae normal.      Pupils: Pupils are equal, round, and reactive to light.   Cardiovascular:      Rate and Rhythm: Normal rate and regular rhythm.      Heart sounds: Normal heart sounds.   Pulmonary:      Effort: No respiratory distress.      Breath sounds: No wheezing.   Abdominal:      General: Abdomen is flat. Bowel sounds are normal. There is no distension.      Palpations: Abdomen is soft. There is no mass.      Tenderness: There is no abdominal tenderness. There is no rebound.   Musculoskeletal:         General: Normal range of motion.      Cervical back: Normal range of motion and neck supple.   Skin:     General: Skin is warm and dry.   Neurological:      General: No focal deficit present.      Mental Status: She is alert and oriented to person, place, and time. Mental status is at baseline.   Psychiatric:         Mood and Affect: Mood normal.         Behavior: Behavior normal.         Thought Content: Thought content normal.         Judgment: Judgment normal.         Problem List Items Addressed This Visit             ICD-10-CM       Cardiac and Vasculature    Benign essential hypertension I10    CAD (coronary artery disease) I25.10       ENT    Acute frontal sinusitis - Primary J01.10    Relevant Medications    fluticasone (Flonase) 50 mcg/actuation nasal spray    methylPREDNISolone (Medrol Dospak) 4 mg tablets    Other Relevant Orders    Referral to ENT    Allergic rhinitis J30.9    Relevant Medications    fluticasone (Flonase) 50 mcg/actuation nasal spray    methylPREDNISolone (Medrol Dospak) 4 mg tablets    Other Relevant Orders    Referral to ENT       Endocrine/Metabolic     "Hyperglycemia R73.9       Mental Health    Depression F32.A     Other Visit Diagnoses         Codes    BMI 29.0-29.9,adult     Z68.29          Assessment/Plan     Sick appt    earaches and no nasal drainage. Frontal sinus pressure. x 7 months She is on Claritin and Flonase as directed.   She denies any fevers or chills.  Allergic rhinitis  CT sinuses negative  Change Claritin to Zyrtec felt \"high\"  went back to claritin  continue Flonase as directed.  Medrol dose janice as directed  Risk / benefits rev'd  ENT consult     depression stable on rx no side effects    from her spouse and moved in with her daughter.     hypertension stable on rx no side effects  Limit NSAIDs  Follow BP closely     Hyperglycemia on diet  HbA1c 5.6     h/o gallstones occasional pain     Irritated hemorrhoids and bloating. better on rx     MI,S/P PTCA with stent, arrhythmia / history of cardiac arrest stable on rx  sees cardio     arthritis needs tramadol prn     cholesterol stable on rx no side effects     GERD stable on rx no side effects     Diet and exercise reviewed     do mammogram, dexa not done yet     GYN on follow up     Follow up as scheduled  "

## 2023-11-27 PROBLEM — M15.9 GENERALIZED OSTEOARTHRITIS: Status: ACTIVE | Noted: 2023-11-27

## 2023-11-27 PROBLEM — E78.5 HYPERLIPIDEMIA: Status: ACTIVE | Noted: 2023-11-27

## 2023-11-27 PROBLEM — G47.00 INSOMNIA: Status: ACTIVE | Noted: 2023-11-27

## 2023-11-27 PROBLEM — E11.9 TYPE 2 DIABETES MELLITUS WITHOUT COMPLICATION (MULTI): Status: ACTIVE | Noted: 2023-11-27

## 2023-11-27 PROBLEM — N95.1 MENOPAUSAL SYMPTOM: Status: ACTIVE | Noted: 2023-11-27

## 2023-11-27 RX ORDER — LOSARTAN POTASSIUM 50 MG/1
50 TABLET ORAL 2 TIMES DAILY
COMMUNITY
End: 2024-01-30 | Stop reason: WASHOUT

## 2023-11-27 RX ORDER — FENOFIBRATE 160 MG/1
160 TABLET ORAL DAILY
COMMUNITY
Start: 2015-01-09 | End: 2024-01-30 | Stop reason: WASHOUT

## 2023-11-27 RX ORDER — MELOXICAM 15 MG/1
15 TABLET ORAL DAILY
COMMUNITY
End: 2024-01-30 | Stop reason: WASHOUT

## 2023-11-30 ENCOUNTER — OFFICE VISIT (OUTPATIENT)
Dept: PRIMARY CARE | Facility: CLINIC | Age: 63
End: 2023-11-30
Payer: COMMERCIAL

## 2023-11-30 VITALS
HEART RATE: 58 BPM | SYSTOLIC BLOOD PRESSURE: 120 MMHG | WEIGHT: 147 LBS | BODY MASS INDEX: 29.69 KG/M2 | OXYGEN SATURATION: 96 % | DIASTOLIC BLOOD PRESSURE: 76 MMHG

## 2023-11-30 DIAGNOSIS — I25.10 CORONARY ARTERY DISEASE INVOLVING NATIVE CORONARY ARTERY OF NATIVE HEART WITHOUT ANGINA PECTORIS: ICD-10-CM

## 2023-11-30 DIAGNOSIS — E78.00 HYPERCHOLESTEROLEMIA: ICD-10-CM

## 2023-11-30 DIAGNOSIS — F32.A DEPRESSION, UNSPECIFIED DEPRESSION TYPE: Primary | ICD-10-CM

## 2023-11-30 DIAGNOSIS — K21.9 GASTROESOPHAGEAL REFLUX DISEASE, UNSPECIFIED WHETHER ESOPHAGITIS PRESENT: ICD-10-CM

## 2023-11-30 DIAGNOSIS — I10 BENIGN ESSENTIAL HYPERTENSION: ICD-10-CM

## 2023-11-30 PROCEDURE — 4010F ACE/ARB THERAPY RXD/TAKEN: CPT | Performed by: INTERNAL MEDICINE

## 2023-11-30 PROCEDURE — 3008F BODY MASS INDEX DOCD: CPT | Performed by: INTERNAL MEDICINE

## 2023-11-30 PROCEDURE — 99214 OFFICE O/P EST MOD 30 MIN: CPT | Performed by: INTERNAL MEDICINE

## 2023-11-30 PROCEDURE — 3078F DIAST BP <80 MM HG: CPT | Performed by: INTERNAL MEDICINE

## 2023-11-30 PROCEDURE — 3074F SYST BP LT 130 MM HG: CPT | Performed by: INTERNAL MEDICINE

## 2023-11-30 PROCEDURE — 1036F TOBACCO NON-USER: CPT | Performed by: INTERNAL MEDICINE

## 2023-11-30 RX ORDER — PANTOPRAZOLE SODIUM 40 MG/1
40 TABLET, DELAYED RELEASE ORAL DAILY
Qty: 90 TABLET | Refills: 0 | Status: SHIPPED | OUTPATIENT
Start: 2023-11-30 | End: 2024-03-05 | Stop reason: SDUPTHER

## 2023-11-30 RX ORDER — METOPROLOL SUCCINATE 25 MG/1
25 TABLET, EXTENDED RELEASE ORAL DAILY
Qty: 90 TABLET | Refills: 0 | Status: SHIPPED | OUTPATIENT
Start: 2023-11-30 | End: 2024-03-05 | Stop reason: SDUPTHER

## 2023-11-30 NOTE — PROGRESS NOTES
Subjective   Patient ID: Livia Jordan is a 63 y.o. female who presents for 3 month med management.  HPI    Routine follow up        Ear issues due to stress of clenching teeth per dentist      ENT follow up 1-24    depression stable / frustrated  on rx no side effects    from her spouse and moved in with her daughter.     hypertension stable on rx no side effects  Limit NSAIDs  Follow BP closely     Hyperglycemia on diet  HbA1c 5.6     h/o gallstones occasional pain     Irritated hemorrhoids and bloating. better on rx     MI,S/P PTCA with stent, arrhythmia / history of cardiac arrest stable on rx  sees cardio     arthritis needs tramadol prn     cholesterol stable on rx no side effects     GERD stable on rx no side effects     Diet and exercise reviewed     do mammogram, dexa not done yet     GYN on follow up    Review of Systems   All other systems reviewed and are negative.      Objective   /76   Pulse 58   Wt 66.7 kg (147 lb)   SpO2 96%   BMI 29.69 kg/m²   Lab Results   Component Value Date    WBC 8.1 12/11/2017    HGB 13.8 12/11/2017    HCT 39.0 12/11/2017     12/11/2017    CHOL 168 01/22/2018    TRIG 118 01/22/2018    HDL 57.3 01/22/2018    ALT 39 01/22/2018    AST 22 01/22/2018     01/22/2018    K 4.1 01/22/2018     01/22/2018    CREATININE 0.73 01/22/2018    BUN 21 01/22/2018    CO2 33 (H) 01/22/2018    INR 1.0 11/30/2017           Physical Exam  Vitals reviewed.   Constitutional:       Appearance: Normal appearance. She is obese.   HENT:      Head: Normocephalic and atraumatic.      Mouth/Throat:      Pharynx: No posterior oropharyngeal erythema.   Eyes:      General: No scleral icterus.     Conjunctiva/sclera: Conjunctivae normal.      Pupils: Pupils are equal, round, and reactive to light.   Cardiovascular:      Rate and Rhythm: Normal rate and regular rhythm.      Heart sounds: Normal heart sounds.   Pulmonary:      Effort: No respiratory distress.      Breath  sounds: No wheezing.   Abdominal:      General: Abdomen is flat. Bowel sounds are normal. There is no distension.      Palpations: Abdomen is soft. There is no mass.      Tenderness: There is no abdominal tenderness. There is no rebound.   Musculoskeletal:         General: Normal range of motion.      Cervical back: Normal range of motion and neck supple.   Skin:     General: Skin is warm and dry.   Neurological:      General: No focal deficit present.      Mental Status: She is alert and oriented to person, place, and time. Mental status is at baseline.   Psychiatric:         Mood and Affect: Mood normal.         Behavior: Behavior normal.         Thought Content: Thought content normal.         Judgment: Judgment normal.         Problem List Items Addressed This Visit             ICD-10-CM       Cardiac and Vasculature    Benign essential hypertension I10    Relevant Medications    metoprolol succinate XL (Toprol-XL) 25 mg 24 hr tablet    CAD (coronary artery disease) I25.10    Relevant Medications    metoprolol succinate XL (Toprol-XL) 25 mg 24 hr tablet    Hypercholesterolemia E78.00       Gastrointestinal and Abdominal    Gastroesophageal reflux disease K21.9    Relevant Medications    pantoprazole (ProtoNix) 40 mg EC tablet       Mental Health    Depression - Primary F32.A     Other Visit Diagnoses         Codes    BMI 29.0-29.9,adult     Z68.29          Assessment/Plan       Ear issues due to stress of clenching teeth per dentist      ENT follow up 1-24    depression stable / frustrated  on rx no side effects    from her spouse and moved in with her daughter.     hypertension stable on rx no side effects  Limit NSAIDs  Follow BP closely     Hyperglycemia on diet  HbA1c 5.6     h/o gallstones occasional pain     Irritated hemorrhoids and bloating. better on rx     MI,S/P PTCA with stent, arrhythmia / history of cardiac arrest stable on rx  sees cardio     arthritis needs tramadol prn     cholesterol  stable on rx no side effects     GERD stable on rx no side effects     Diet and exercise reviewed     do mammogram, dexa not done yet     GYN on follow up    Follow up 3 months

## 2024-01-09 ENCOUNTER — OFFICE VISIT (OUTPATIENT)
Dept: OTOLARYNGOLOGY | Facility: CLINIC | Age: 64
End: 2024-01-09
Payer: COMMERCIAL

## 2024-01-09 VITALS — BODY MASS INDEX: 32.15 KG/M2 | WEIGHT: 149 LBS | HEIGHT: 57 IN | TEMPERATURE: 96.8 F

## 2024-01-09 DIAGNOSIS — H92.01 REFERRED OTALGIA OF RIGHT EAR: ICD-10-CM

## 2024-01-09 DIAGNOSIS — H92.02 REFERRED OTALGIA OF LEFT EAR: Primary | ICD-10-CM

## 2024-01-09 DIAGNOSIS — H61.21 IMPACTED CERUMEN OF RIGHT EAR: ICD-10-CM

## 2024-01-09 DIAGNOSIS — M26.609 TEMPOROMANDIBULAR JOINT DISORDER: ICD-10-CM

## 2024-01-09 PROCEDURE — 3008F BODY MASS INDEX DOCD: CPT | Performed by: OTOLARYNGOLOGY

## 2024-01-09 PROCEDURE — 1036F TOBACCO NON-USER: CPT | Performed by: OTOLARYNGOLOGY

## 2024-01-09 PROCEDURE — 69210 REMOVE IMPACTED EAR WAX UNI: CPT | Performed by: OTOLARYNGOLOGY

## 2024-01-09 PROCEDURE — 4010F ACE/ARB THERAPY RXD/TAKEN: CPT | Performed by: OTOLARYNGOLOGY

## 2024-01-09 PROCEDURE — 99203 OFFICE O/P NEW LOW 30 MIN: CPT | Performed by: OTOLARYNGOLOGY

## 2024-01-09 NOTE — PROGRESS NOTES
Chief Complaint   Patient presents with    New Patient Visit     N/P    started in April with burning in ears, headaches  no help from antibiotics, dentist thinks it is from clutching teeth     HPI:  Livia Jordan is a 63 y.o. female who complains of problems with facial pressure and pain and ear pain since 2023.  2023 had normal CT scan of the sinuses.  Denies sore throat, hoarseness, dysphagia, neck pain, neck mass.  Ears are a little bit plugged up.  She has been seeing her dentist and has been getting good relief for treatment for her bruxism and clenching.    PMH:  Past Medical History:   Diagnosis Date    Acute bronchitis, unspecified 2017    Bronchitis, acute    Calculus of gallbladder without cholecystitis without obstruction 2018    Cholelithiases    Encounter for screening mammogram for malignant neoplasm of breast 2016    Visit for screening mammogram    Hyperglycemia, unspecified 2022    Hyperglycemia    Other ventricular tachycardia (CMS/HCC) 2017    Polymorphic ventricular tachycardia    Pain in right shoulder 2017    Right shoulder pain    Personal history of other infectious and parasitic diseases     History of chicken pox    Personal history of peptic ulcer disease 2015    History of peptic ulcer    Unspecified osteoarthritis, unspecified site 2017    Osteoarthritis     Past Surgical History:   Procedure Laterality Date    CARPAL TUNNEL RELEASE  2015    Neuroplasty Decompression Median Nerve At Carpal Tunnel     SECTION, CLASSIC  2016     Section    COLONOSCOPY  2016    Complete Colonoscopy    CORONARY ANGIOPLASTY WITH STENT PLACEMENT  2017    Cath Stent Placement    OTHER SURGICAL HISTORY  2016    Fiberoptic Examinations Esophagoscopy    TONSILLECTOMY  2016    Tonsillectomy    TUBAL LIGATION  2016    Tubal Ligation         Medications:     Current Outpatient Medications:      "amLODIPine (Norvasc) 10 mg tablet, Take 1 tablet (10 mg) by mouth once daily., Disp: 90 tablet, Rfl: 0    aspirin 81 mg EC tablet, Take 1 tablet (81 mg) by mouth once daily., Disp: , Rfl:     cholecalciferol (Vitamin D-3) 125 MCG (5000 UT) capsule, Take by mouth., Disp: , Rfl:     fluticasone (Flonase) 50 mcg/actuation nasal spray, Administer 1 spray into each nostril once daily., Disp: 16 g, Rfl: 2    loratadine (Claritin) 10 mg tablet, Take 1 tablet (10 mg) by mouth once daily., Disp: , Rfl:     metoprolol succinate XL (Toprol-XL) 25 mg 24 hr tablet, Take 1 tablet (25 mg) by mouth once daily., Disp: 90 tablet, Rfl: 0    pantoprazole (ProtoNix) 40 mg EC tablet, Take 1 tablet (40 mg) by mouth once daily., Disp: 90 tablet, Rfl: 0    rosuvastatin (Crestor) 40 mg tablet, Take 1 tablet (40 mg) by mouth once daily., Disp: 90 tablet, Rfl: 0    sertraline (Zoloft) 100 mg tablet, Take 1 tablet (100 mg) by mouth once daily., Disp: 90 tablet, Rfl: 0    valsartan-hydrochlorothiazide (Diovan-HCT) 160-12.5 mg tablet, Take 1 tablet by mouth once daily., Disp: 90 tablet, Rfl: 0    fenofibrate (Triglide) 160 mg tablet, Take 1 tablet (160 mg) by mouth once daily., Disp: , Rfl:     losartan (Cozaar) 50 mg tablet, Take 1 tablet (50 mg) by mouth 2 times a day., Disp: , Rfl:     meloxicam (Mobic) 15 mg tablet, Take 1 tablet (15 mg) by mouth once daily., Disp: , Rfl:      Allergies:  Allergies   Allergen Reactions    Amiodarone Itching and Rash     painful    Enalapril Maleate Cough        ROS:  Review of systems normal unless stated otherwise in the HPI and/or PMH.    Physical Exam:  Temperature 36 °C (96.8 °F), height 1.448 m (4' 9\"), weight 67.6 kg (149 lb). Body mass index is 32.24 kg/m².     GENERAL APPEARANCE: Well developed and well nourished.  Alert and oriented in no acute distress.  Normal vocal quality.      HEAD/FACE: No erythema or edema or facial tenderness.  Normal facial nerve function bilaterally.    EAR:       EXTERNAL: " Normal pinnas and external auditory canals without lesion or obstructing wax.  Right impacted wax which was removed with microscope and alligators.       MIDDLE EAR: Tympanic membranes intact and mobile with normal landmarks.  Middle ear space appears well aerated.       TUBE STATUS: N/A       MASTOID CAVITY: N/A       HEARING: Gross hearing assessment is within normal limits.      NOSE:       VISUALIZED USING: Anterior rhinoscopy with headlight and nasal speculum.       DORSUM: Midline, nontraumatic appearance.       MUCOSA: Normal-appearing.       SECRETIONS: Normal.       SEPTUM: Midline and nonobstructing.       INFERIOR TURBINATES: Normal.       MIDDLE TURBINATES/MEATUS: N/A       BLEEDING: N/A         ORAL CAVITY/PHARYNX:       TEETH: Adequate dentition.       TONGUE: No mass or lesion.  Normal mobility.       FLOOR OF MOUTH: No mass or lesion.       PALATE: Normal hard palate, soft palate, and uvula.       OROPHARYNX: Normal without mass or lesion.       BUCCAL MUCOSA/GBS: Normal without mass or lesion.       LIPS: Normal.    LARYNX/HYPOPHARYNX/NASOPHARYNX: N/A    NECK: No palpable masses or abnormal adenopathy.  Trachea is midline.    THYROID: No thyromegaly or palpable nodule.    SALIVARY GLANDS: Normal bilateral parotid and submandibular glands by inspection and palpation.    TMJ's: Normal.    NEURO: Cranial nerve exam grossly normal bilaterally.       Assessment/Plan   Livia was seen today for new patient visit.  Diagnoses and all orders for this visit:  Referred otalgia of left ear (Primary)  Referred otalgia of right ear  Temporomandibular joint disorder  Impacted cerumen of right ear     Continue treatment for her bruxism this does seem to be helping.  Wax was removed from the right.  Normal head neck exam.  Follow up if symptoms worsen or fail to improve.   Consider hearing test    Mukesh Roberts MD

## 2024-01-16 DIAGNOSIS — F32.A DEPRESSION, UNSPECIFIED DEPRESSION TYPE: ICD-10-CM

## 2024-01-16 DIAGNOSIS — J30.9 ALLERGIC RHINITIS, UNSPECIFIED SEASONALITY, UNSPECIFIED TRIGGER: ICD-10-CM

## 2024-01-16 DIAGNOSIS — I10 BENIGN HYPERTENSION: ICD-10-CM

## 2024-01-16 DIAGNOSIS — E78.00 HYPERCHOLESTEROLEMIA: ICD-10-CM

## 2024-01-16 DIAGNOSIS — J01.11 ACUTE RECURRENT FRONTAL SINUSITIS: ICD-10-CM

## 2024-01-16 RX ORDER — SERTRALINE HYDROCHLORIDE 100 MG/1
100 TABLET, FILM COATED ORAL DAILY
Qty: 90 TABLET | Refills: 0 | Status: SHIPPED | OUTPATIENT
Start: 2024-01-16 | End: 2024-04-09 | Stop reason: SDUPTHER

## 2024-01-16 RX ORDER — VALSARTAN AND HYDROCHLOROTHIAZIDE 160; 12.5 MG/1; MG/1
1 TABLET, FILM COATED ORAL DAILY
Qty: 90 TABLET | Refills: 0 | Status: SHIPPED | OUTPATIENT
Start: 2024-01-16 | End: 2024-04-09 | Stop reason: SDUPTHER

## 2024-01-16 RX ORDER — AMLODIPINE BESYLATE 10 MG/1
10 TABLET ORAL DAILY
Qty: 90 TABLET | Refills: 0 | Status: SHIPPED | OUTPATIENT
Start: 2024-01-16 | End: 2024-04-09 | Stop reason: SDUPTHER

## 2024-01-16 RX ORDER — ROSUVASTATIN CALCIUM 40 MG/1
40 TABLET, COATED ORAL DAILY
Qty: 90 TABLET | Refills: 0 | Status: SHIPPED | OUTPATIENT
Start: 2024-01-16 | End: 2024-04-09 | Stop reason: SDUPTHER

## 2024-01-16 RX ORDER — FLUTICASONE PROPIONATE 50 MCG
1 SPRAY, SUSPENSION (ML) NASAL DAILY
Qty: 16 G | Refills: 2 | Status: SHIPPED | OUTPATIENT
Start: 2024-01-16 | End: 2024-04-15

## 2024-01-30 ENCOUNTER — OFFICE VISIT (OUTPATIENT)
Dept: CARDIOLOGY | Facility: CLINIC | Age: 64
End: 2024-01-30
Payer: COMMERCIAL

## 2024-01-30 VITALS
WEIGHT: 150.6 LBS | SYSTOLIC BLOOD PRESSURE: 152 MMHG | DIASTOLIC BLOOD PRESSURE: 85 MMHG | OXYGEN SATURATION: 94 % | RESPIRATION RATE: 18 BRPM | HEIGHT: 59 IN | BODY MASS INDEX: 30.36 KG/M2 | HEART RATE: 74 BPM

## 2024-01-30 DIAGNOSIS — I10 BENIGN HYPERTENSION: ICD-10-CM

## 2024-01-30 DIAGNOSIS — E78.00 HYPERCHOLESTEROLEMIA: ICD-10-CM

## 2024-01-30 DIAGNOSIS — I25.10 CORONARY ARTERY DISEASE INVOLVING NATIVE CORONARY ARTERY OF NATIVE HEART WITHOUT ANGINA PECTORIS: Primary | ICD-10-CM

## 2024-01-30 PROCEDURE — 1036F TOBACCO NON-USER: CPT | Performed by: INTERNAL MEDICINE

## 2024-01-30 PROCEDURE — 99214 OFFICE O/P EST MOD 30 MIN: CPT | Performed by: INTERNAL MEDICINE

## 2024-01-30 PROCEDURE — 99214 OFFICE O/P EST MOD 30 MIN: CPT | Mod: 25,27 | Performed by: INTERNAL MEDICINE

## 2024-01-30 PROCEDURE — 93010 ELECTROCARDIOGRAM REPORT: CPT | Performed by: INTERNAL MEDICINE

## 2024-01-30 PROCEDURE — 93005 ELECTROCARDIOGRAM TRACING: CPT | Performed by: INTERNAL MEDICINE

## 2024-01-30 PROCEDURE — 3079F DIAST BP 80-89 MM HG: CPT | Performed by: INTERNAL MEDICINE

## 2024-01-30 PROCEDURE — 3077F SYST BP >= 140 MM HG: CPT | Performed by: INTERNAL MEDICINE

## 2024-01-30 PROCEDURE — 3008F BODY MASS INDEX DOCD: CPT | Performed by: INTERNAL MEDICINE

## 2024-01-30 ASSESSMENT — PATIENT HEALTH QUESTIONNAIRE - PHQ9
2. FEELING DOWN, DEPRESSED OR HOPELESS: MORE THAN HALF THE DAYS
SUM OF ALL RESPONSES TO PHQ9 QUESTIONS 1 AND 2: 2
1. LITTLE INTEREST OR PLEASURE IN DOING THINGS: NOT AT ALL

## 2024-01-30 ASSESSMENT — ENCOUNTER SYMPTOMS: DEPRESSION: 1

## 2024-01-30 NOTE — PROGRESS NOTES
"Chief Complaint:   Follow-up (Annual FUV)     History Of Present Illness:    Livia Jordan is a 63 y.o. female presenting for follow-up.  Struggled last year with headaches--finally improving, suspected may be due to stress of teeth clenching at night.  Denies any angina or progressive dyspnea.  Compliant medications.     Last Recorded Vitals:  Vitals:    24 0914   BP: 152/85   BP Location: Left arm   Patient Position: Sitting   BP Cuff Size: Large adult   Pulse: 74   Resp: 18   SpO2: 94%   Weight: 68.3 kg (150 lb 9.6 oz)   Height: 1.486 m (4' 10.5\")       Past Medical History:  She has a past medical history of Acute bronchitis, unspecified (2017), Calculus of gallbladder without cholecystitis without obstruction (2018), Encounter for screening mammogram for malignant neoplasm of breast (2016), Hyperglycemia, unspecified (2022), Other ventricular tachycardia (CMS/HCC) (2017), Pain in right shoulder (2017), Personal history of other infectious and parasitic diseases, Personal history of peptic ulcer disease (2015), and Unspecified osteoarthritis, unspecified site (2017).    Past Surgical History:  She has a past surgical history that includes Carpal tunnel release (2015); Coronary angioplasty with stent (2017); Colonoscopy (2016); Other surgical history (2016);  section, classic (2016); Tubal ligation (2016); and Tonsillectomy (2016).      Social History:  She reports that she quit smoking about 29 years ago. Her smoking use included cigarettes. She has been exposed to tobacco smoke. She has never used smokeless tobacco. She reports that she does not currently use alcohol. She reports that she does not use drugs.    Family History:  No family history on file.     Allergies:  Amiodarone and Enalapril maleate    Outpatient Medications:  Current Outpatient Medications   Medication Instructions    amLODIPine " (NORVASC) 10 mg, oral, Daily    aspirin 81 mg EC tablet 1 tablet, oral, Daily    cholecalciferol (Vitamin D-3) 125 MCG (5000 UT) capsule oral    fluticasone (Flonase) 50 mcg/actuation nasal spray 1 spray, Each Nostril, Daily    loratadine (CLARITIN) 10 mg, oral, Daily    metoprolol succinate XL (TOPROL-XL) 25 mg, oral, Daily    pantoprazole (PROTONIX) 40 mg, oral, Daily    rosuvastatin (CRESTOR) 40 mg, oral, Daily    sertraline (ZOLOFT) 100 mg, oral, Daily    valsartan-hydrochlorothiazide (Diovan-HCT) 160-12.5 mg tablet 1 tablet, oral, Daily       Physical Exam:  Constitutional:       Appearance: Healthy appearance. Not in distress.   Neck:      Vascular: No JVR. JVD normal.   Pulmonary:      Effort: Pulmonary effort is normal.      Breath sounds: Normal breath sounds. No wheezing. No rhonchi. No rales.   Chest:      Chest wall: Not tender to palpatation.   Cardiovascular:      Normal rate. Regular rhythm.      Murmurs: There is no murmur.   Edema:     Peripheral edema absent.   Abdominal:      General: Bowel sounds are normal.      Palpations: Abdomen is soft.      Tenderness: There is no abdominal tenderness.   Musculoskeletal: Normal range of motion. Skin:     General: Skin is warm and dry.   Neurological:      General: No focal deficit present.      Mental Status: Alert and oriented to person, place and time.           Last Labs:  CBC -  Lab Results   Component Value Date    WBC 8.1 12/11/2017    HGB 13.8 12/11/2017    HCT 39.0 12/11/2017    MCV 91 12/11/2017     12/11/2017       CMP -  Lab Results   Component Value Date    CALCIUM 9.6 01/22/2018    PROT 6.8 01/22/2018    ALBUMIN 4.5 01/22/2018    AST 22 01/22/2018    ALT 39 01/22/2018    ALKPHOS 93 01/22/2018    BILITOT 0.6 01/22/2018       LIPID PANEL -   Lab Results   Component Value Date    CHOL 168 01/22/2018    TRIG 118 01/22/2018    HDL 57.3 01/22/2018    CHHDL 2.9 01/22/2018    LDLF 87 01/22/2018    VLDL 24 01/22/2018       RENAL FUNCTION PANEL -  "  Lab Results   Component Value Date    GLUCOSE 99 01/22/2018     01/22/2018    K 4.1 01/22/2018     01/22/2018    CO2 33 (H) 01/22/2018    ANIONGAP 10 01/22/2018    BUN 21 01/22/2018    CREATININE 0.73 01/22/2018    CALCIUM 9.6 01/22/2018    ALBUMIN 4.5 01/22/2018        No results found for: \"BNP\", \"HGBA1C\"    Last Cardiology Tests:  ECG independently reviewed from today: Sinus rhythm, rate 63    Cath 11/30/17:   1. VF arrest s/p recent IMI.   2. Patent mid-RCA stent: OCT performed to rule out occult thrombosis or edge dissection--no stent thrombosis or dissection, however stent from 2 days prior grossly malapposed mid-distally, so decsion made to dilate the malapposed portion of the stent with a 4.0mm NC balloon at 10-20: repeat OCT showed adequate stent apposition throughout.     Echo 11/29/17:   1. The left ventricular systolic function is normal with a 60% estimated ejection fraction.   2. Spectral Doppler shows an impaired relaxation pattern of left ventricular diastolic filling.   3. Mild to moderate mitral valve regurgitation.   4. There is mild tricuspid regurgitation.     Cath 11/28/17:    1. STEMI with mid RCA 99% culprit lesion s/p successful PCI with Resolute Onxy 3.0 x 22mm ANDREW, postdilated with 3.5mm NC balloon at 20 km.   2. LVEDP=10.   3. No significant LV-AO peak to peak gradient.    Assessment/Plan   Very pleasant 63-year-old female with history of inferior MI status post PCI to RCA culprit in November 2017 and hypertension.  Blood pressure can be better controlled--we discussed checking home blood pressures for now rather than changing medications as she is just getting over 10 months of significant headaches.  Will continue current aspirin 81 mg daily indefinitely, metoprolol, rosuvastatin, amlodipine and valsartan-hydrochlorothiazide for now and she will contact us if getting sustained Bps over 140 systolic at home.  Followup in one year or sooner if needed       Garrison MENDEZ" MD [place]

## 2024-02-03 LAB
ATRIAL RATE: 63 BPM
P AXIS: 43 DEGREES
P OFFSET: 185 MS
P ONSET: 136 MS
PR INTERVAL: 172 MS
Q ONSET: 222 MS
QRS COUNT: 10 BEATS
QRS DURATION: 86 MS
QT INTERVAL: 404 MS
QTC CALCULATION(BAZETT): 413 MS
QTC FREDERICIA: 410 MS
R AXIS: 7 DEGREES
T AXIS: 1 DEGREES
T OFFSET: 424 MS
VENTRICULAR RATE: 63 BPM

## 2024-02-16 PROBLEM — Z86.19 HISTORY OF VARICELLA: Status: ACTIVE | Noted: 2024-02-16

## 2024-02-16 PROBLEM — H61.21 IMPACTED CERUMEN OF RIGHT EAR: Status: ACTIVE | Noted: 2024-02-16

## 2024-02-22 ENCOUNTER — APPOINTMENT (OUTPATIENT)
Dept: PRIMARY CARE | Facility: CLINIC | Age: 64
End: 2024-02-22
Payer: COMMERCIAL

## 2024-03-05 DIAGNOSIS — I10 BENIGN ESSENTIAL HYPERTENSION: ICD-10-CM

## 2024-03-05 DIAGNOSIS — K21.9 GASTROESOPHAGEAL REFLUX DISEASE, UNSPECIFIED WHETHER ESOPHAGITIS PRESENT: ICD-10-CM

## 2024-03-05 RX ORDER — PANTOPRAZOLE SODIUM 40 MG/1
40 TABLET, DELAYED RELEASE ORAL DAILY
Qty: 90 TABLET | Refills: 0 | Status: SHIPPED | OUTPATIENT
Start: 2024-03-05 | End: 2024-06-10 | Stop reason: SDUPTHER

## 2024-03-05 RX ORDER — METOPROLOL SUCCINATE 25 MG/1
25 TABLET, EXTENDED RELEASE ORAL DAILY
Qty: 90 TABLET | Refills: 0 | Status: SHIPPED | OUTPATIENT
Start: 2024-03-05 | End: 2024-06-10 | Stop reason: SDUPTHER

## 2024-03-13 ENCOUNTER — OFFICE VISIT (OUTPATIENT)
Dept: PRIMARY CARE | Facility: CLINIC | Age: 64
End: 2024-03-13
Payer: COMMERCIAL

## 2024-03-13 VITALS
BODY MASS INDEX: 30.41 KG/M2 | WEIGHT: 148 LBS | OXYGEN SATURATION: 96 % | HEART RATE: 70 BPM | SYSTOLIC BLOOD PRESSURE: 142 MMHG | DIASTOLIC BLOOD PRESSURE: 80 MMHG

## 2024-03-13 DIAGNOSIS — I10 BENIGN ESSENTIAL HYPERTENSION: ICD-10-CM

## 2024-03-13 DIAGNOSIS — E55.9 VITAMIN D DEFICIENCY: ICD-10-CM

## 2024-03-13 DIAGNOSIS — Z00.00 ANNUAL PHYSICAL EXAM: Primary | ICD-10-CM

## 2024-03-13 DIAGNOSIS — U07.1 COVID-19 VIRUS INFECTION: ICD-10-CM

## 2024-03-13 DIAGNOSIS — R73.9 HYPERGLYCEMIA: ICD-10-CM

## 2024-03-13 DIAGNOSIS — E66.09 CLASS 1 OBESITY DUE TO EXCESS CALORIES WITH SERIOUS COMORBIDITY AND BODY MASS INDEX (BMI) OF 30.0 TO 30.9 IN ADULT: ICD-10-CM

## 2024-03-13 DIAGNOSIS — Z12.11 ENCOUNTER FOR SCREENING FOR MALIGNANT NEOPLASM OF COLON: ICD-10-CM

## 2024-03-13 DIAGNOSIS — F32.A DEPRESSION, UNSPECIFIED DEPRESSION TYPE: ICD-10-CM

## 2024-03-13 DIAGNOSIS — I25.10 CORONARY ARTERY DISEASE INVOLVING NATIVE CORONARY ARTERY OF NATIVE HEART WITHOUT ANGINA PECTORIS: ICD-10-CM

## 2024-03-13 DIAGNOSIS — E78.00 HYPERCHOLESTEREMIA: ICD-10-CM

## 2024-03-13 PROBLEM — E11.9 TYPE 2 DIABETES MELLITUS WITHOUT COMPLICATION (MULTI): Status: RESOLVED | Noted: 2023-11-27 | Resolved: 2024-03-13

## 2024-03-13 PROBLEM — E11.3293 TYPE 2 DIABETES MELLITUS WITH BOTH EYES AFFECTED BY MILD NONPROLIFERATIVE RETINOPATHY WITHOUT MACULAR EDEMA, WITHOUT LONG-TERM CURRENT USE OF INSULIN (MULTI): Status: RESOLVED | Noted: 2023-11-01 | Resolved: 2024-03-13

## 2024-03-13 PROCEDURE — 3079F DIAST BP 80-89 MM HG: CPT | Performed by: INTERNAL MEDICINE

## 2024-03-13 PROCEDURE — 3077F SYST BP >= 140 MM HG: CPT | Performed by: INTERNAL MEDICINE

## 2024-03-13 PROCEDURE — 99396 PREV VISIT EST AGE 40-64: CPT | Performed by: INTERNAL MEDICINE

## 2024-03-13 PROCEDURE — 3008F BODY MASS INDEX DOCD: CPT | Performed by: INTERNAL MEDICINE

## 2024-03-13 PROCEDURE — 99214 OFFICE O/P EST MOD 30 MIN: CPT | Performed by: INTERNAL MEDICINE

## 2024-03-13 PROCEDURE — 1036F TOBACCO NON-USER: CPT | Performed by: INTERNAL MEDICINE

## 2024-03-13 NOTE — PROGRESS NOTES
Subjective   Patient ID: Livia Jordan is a 63 y.o. female who presents for yearly physical / 3 month follow up.  HPI       Yearly physical    Mammogram 4-18 /pt declined  Dexa 11-16  Colonoscopy 7-16 / prefers cologuard  CT chest lung cancer screening n/a  GYN 2016  immunizations rev'd RSV, shingrix  BMI 30.4        Follow up        COVID last month no residuals        Ear issues due to stress of clenching teeth per dentist      ENT rev'd ears cleaned     depression stable / frustrated  on rx no side effects    from her spouse and moved in with her daughter.     hypertension stable on rx no side effects  Limit NSAIDs  Follow BP closely     Hyperglycemia on diet  HbA1c 5.6     h/o gallstones occasional pain     Irritated hemorrhoids and bloating. better on rx     MI,S/P PTCA with stent, arrhythmia / history of cardiac arrest stable on rx  sees cardio     arthritis needs tramadol prn     cholesterol stable on rx no side effects     GERD stable on rx no side effects     Diet and exercise reviewed     do mammogram, dexa not done yet     GYN on follow up    Review of Systems   All other systems reviewed and are negative.      Objective   /80   Pulse 70   Wt 67.1 kg (148 lb)   SpO2 96%   BMI 30.41 kg/m²   Lab Results   Component Value Date    WBC 8.1 12/11/2017    HGB 13.8 12/11/2017    HCT 39.0 12/11/2017     12/11/2017    CHOL 168 01/22/2018    TRIG 118 01/22/2018    HDL 57.3 01/22/2018    ALT 39 01/22/2018    AST 22 01/22/2018     01/22/2018    K 4.1 01/22/2018     01/22/2018    CREATININE 0.73 01/22/2018    BUN 21 01/22/2018    CO2 33 (H) 01/22/2018    INR 1.0 11/30/2017           Physical Exam  Vitals reviewed.   Constitutional:       Appearance: Normal appearance. She is obese.   HENT:      Head: Normocephalic and atraumatic.      Mouth/Throat:      Pharynx: No posterior oropharyngeal erythema.   Eyes:      General: No scleral icterus.     Conjunctiva/sclera: Conjunctivae  normal.      Pupils: Pupils are equal, round, and reactive to light.   Cardiovascular:      Rate and Rhythm: Normal rate and regular rhythm.      Heart sounds: Normal heart sounds.   Pulmonary:      Effort: No respiratory distress.      Breath sounds: No wheezing.   Abdominal:      General: Abdomen is flat. Bowel sounds are normal. There is no distension.      Palpations: Abdomen is soft. There is no mass.      Tenderness: There is no abdominal tenderness. There is no rebound.   Musculoskeletal:         General: Normal range of motion.      Cervical back: Normal range of motion and neck supple.   Skin:     General: Skin is warm and dry.   Neurological:      General: No focal deficit present.      Mental Status: She is alert and oriented to person, place, and time. Mental status is at baseline.   Psychiatric:         Mood and Affect: Mood normal.         Behavior: Behavior normal.         Thought Content: Thought content normal.         Judgment: Judgment normal.         Problem List Items Addressed This Visit             ICD-10-CM    Benign essential hypertension I10    CAD (coronary artery disease) I25.10    COVID-19 virus infection U07.1    Depression F32.A    Hyperglycemia R73.9    Relevant Orders    Hemoglobin A1C    Vitamin D deficiency E55.9    Relevant Orders    Vitamin D 25-Hydroxy,Total (for eval of Vitamin D levels)     Other Visit Diagnoses         Codes    Annual physical exam    -  Primary Z00.00    Relevant Orders    CBC and Auto Differential    Comprehensive Metabolic Panel    Lipid Panel    TSH with reflex to Free T4 if abnormal    Hypercholesteremia     E78.00    Encounter for screening for malignant neoplasm of colon     Z12.11    Relevant Orders    Cologuard® colon cancer screening    Class 1 obesity due to excess calories with serious comorbidity and body mass index (BMI) of 30.0 to 30.9 in adult     E66.09, Z68.30          Assessment/Plan        Yearly physical    Mammogram 4-18 /pt  declined  Dexa 11-16 slight osteopenia  Colonoscopy 7-16 / prefers cologuard  CT chest lung cancer screening n/a  GYN 2016  immunizations rev'd RSV, shingrix  BMI 30.4        Follow up        COVID last month no residuals        Ear issues due to stress of clenching teeth per dentist      ENT rev'd ears cleaned     depression stable / frustrated  on rx no side effects    from her spouse and moved in with her daughter.     hypertension stable on rx no side effects  Limit NSAIDs  Follow BP closely     Hyperglycemia on diet  HbA1c 5.6     h/o gallstones occasional pain     Irritated hemorrhoids and bloating. better on rx     MI,S/P PTCA with stent, arrhythmia / history of cardiac arrest stable on rx  sees cardio     arthritis needs tramadol prn     cholesterol stable on rx no side effects     GERD stable on rx no side effects     Diet and exercise reviewed     do mammogram, dexa not done yet     GYN on follow up    Check labs, cologuard    Follow up 3 months

## 2024-03-28 LAB — NONINV COLON CA DNA+OCC BLD SCRN STL QL: NEGATIVE

## 2024-04-09 DIAGNOSIS — I10 BENIGN HYPERTENSION: ICD-10-CM

## 2024-04-09 DIAGNOSIS — F32.A DEPRESSION, UNSPECIFIED DEPRESSION TYPE: ICD-10-CM

## 2024-04-09 DIAGNOSIS — E78.00 HYPERCHOLESTEROLEMIA: ICD-10-CM

## 2024-04-09 RX ORDER — AMLODIPINE BESYLATE 10 MG/1
10 TABLET ORAL DAILY
Qty: 90 TABLET | Refills: 0 | Status: SHIPPED | OUTPATIENT
Start: 2024-04-09

## 2024-04-09 RX ORDER — VALSARTAN AND HYDROCHLOROTHIAZIDE 160; 12.5 MG/1; MG/1
1 TABLET, FILM COATED ORAL DAILY
Qty: 90 TABLET | Refills: 0 | Status: SHIPPED | OUTPATIENT
Start: 2024-04-09

## 2024-04-09 RX ORDER — SERTRALINE HYDROCHLORIDE 100 MG/1
100 TABLET, FILM COATED ORAL DAILY
Qty: 90 TABLET | Refills: 0 | Status: SHIPPED | OUTPATIENT
Start: 2024-04-09

## 2024-04-09 RX ORDER — ROSUVASTATIN CALCIUM 40 MG/1
40 TABLET, COATED ORAL DAILY
Qty: 90 TABLET | Refills: 0 | Status: SHIPPED | OUTPATIENT
Start: 2024-04-09

## 2024-06-10 DIAGNOSIS — K21.9 GASTROESOPHAGEAL REFLUX DISEASE, UNSPECIFIED WHETHER ESOPHAGITIS PRESENT: ICD-10-CM

## 2024-06-10 DIAGNOSIS — I10 BENIGN ESSENTIAL HYPERTENSION: ICD-10-CM

## 2024-06-10 RX ORDER — METOPROLOL SUCCINATE 25 MG/1
25 TABLET, EXTENDED RELEASE ORAL DAILY
Qty: 90 TABLET | Refills: 0 | Status: SHIPPED | OUTPATIENT
Start: 2024-06-10

## 2024-06-10 RX ORDER — PANTOPRAZOLE SODIUM 40 MG/1
40 TABLET, DELAYED RELEASE ORAL DAILY
Qty: 90 TABLET | Refills: 0 | Status: SHIPPED | OUTPATIENT
Start: 2024-06-10

## 2024-06-19 ENCOUNTER — APPOINTMENT (OUTPATIENT)
Dept: PRIMARY CARE | Facility: CLINIC | Age: 64
End: 2024-06-19
Payer: COMMERCIAL

## 2024-06-19 VITALS
DIASTOLIC BLOOD PRESSURE: 66 MMHG | OXYGEN SATURATION: 97 % | SYSTOLIC BLOOD PRESSURE: 124 MMHG | WEIGHT: 148.2 LBS | BODY MASS INDEX: 30.45 KG/M2 | HEART RATE: 71 BPM | TEMPERATURE: 97.3 F

## 2024-06-19 DIAGNOSIS — E11.69 TYPE 2 DIABETES MELLITUS WITH OTHER SPECIFIED COMPLICATION, WITHOUT LONG-TERM CURRENT USE OF INSULIN (MULTI): ICD-10-CM

## 2024-06-19 DIAGNOSIS — M79.671 CHRONIC PAIN OF BOTH FEET: ICD-10-CM

## 2024-06-19 DIAGNOSIS — E66.09 CLASS 1 OBESITY DUE TO EXCESS CALORIES WITH SERIOUS COMORBIDITY AND BODY MASS INDEX (BMI) OF 30.0 TO 30.9 IN ADULT: ICD-10-CM

## 2024-06-19 DIAGNOSIS — M79.672 CHRONIC PAIN OF BOTH FEET: ICD-10-CM

## 2024-06-19 DIAGNOSIS — F32.A DEPRESSION, UNSPECIFIED DEPRESSION TYPE: ICD-10-CM

## 2024-06-19 DIAGNOSIS — K21.9 GASTROESOPHAGEAL REFLUX DISEASE, UNSPECIFIED WHETHER ESOPHAGITIS PRESENT: ICD-10-CM

## 2024-06-19 DIAGNOSIS — I25.10 CORONARY ARTERY DISEASE INVOLVING NATIVE CORONARY ARTERY OF NATIVE HEART WITHOUT ANGINA PECTORIS: ICD-10-CM

## 2024-06-19 DIAGNOSIS — Z71.2 ENCOUNTER TO DISCUSS TEST RESULTS: Primary | ICD-10-CM

## 2024-06-19 DIAGNOSIS — E78.00 HYPERCHOLESTEREMIA: ICD-10-CM

## 2024-06-19 DIAGNOSIS — G89.29 CHRONIC PAIN OF BOTH FEET: ICD-10-CM

## 2024-06-19 DIAGNOSIS — I10 BENIGN ESSENTIAL HYPERTENSION: ICD-10-CM

## 2024-06-19 PROCEDURE — 3008F BODY MASS INDEX DOCD: CPT | Performed by: INTERNAL MEDICINE

## 2024-06-19 PROCEDURE — 1036F TOBACCO NON-USER: CPT | Performed by: INTERNAL MEDICINE

## 2024-06-19 PROCEDURE — 3078F DIAST BP <80 MM HG: CPT | Performed by: INTERNAL MEDICINE

## 2024-06-19 PROCEDURE — 99214 OFFICE O/P EST MOD 30 MIN: CPT | Performed by: INTERNAL MEDICINE

## 2024-06-19 PROCEDURE — 3074F SYST BP LT 130 MM HG: CPT | Performed by: INTERNAL MEDICINE

## 2024-06-19 RX ORDER — SEMAGLUTIDE 0.68 MG/ML
0.5 INJECTION, SOLUTION SUBCUTANEOUS
Qty: 3 ML | Refills: 0 | Status: SHIPPED | OUTPATIENT
Start: 2024-06-19

## 2024-06-19 RX ORDER — SERTRALINE HYDROCHLORIDE 100 MG/1
100 TABLET, FILM COATED ORAL 2 TIMES DAILY
Qty: 180 TABLET | Refills: 0 | Status: SHIPPED | OUTPATIENT
Start: 2024-06-19

## 2024-06-19 NOTE — PROGRESS NOTES
Subjective   Patient ID: Livia Jordan is a 63 y.o. female who presents for 3 month follow up .  HPI    Routine follow up    Labs rev'd         Ear issues due to stress of clenching teeth per dentist      ENT rev'd ears cleaned     depression  / frustrated  on rx no side effects    from her spouse and moved in with her daughter.  Increased zoloft 200 mg daily     hypertension stable on rx no side effects  Limit NSAIDs  Follow BP closely     Diabetes mellitus 2 new onset  HbA1c 7.8  5-24  Start Ozempic 0.25 mg weekly  Risk / benefits rev'd  Check fasting blood sugars and record  Chronic feet pain  podiatry consult     h/o gallstones occasional pain     Irritated hemorrhoids and bloating. better on rx     MI,S/P PTCA with stent, arrhythmia / history of cardiac arrest stable on rx  sees cardio     arthritis needs tramadol prn     cholesterol high on rx no side effects  follow     GERD on rx no side effects  Was bad when ate poorly     Diet and exercise reviewed     do mammogram, dexa not done yet     GYN on follow up      Cologuard neg 3-24    Review of Systems   All other systems reviewed and are negative.      Objective   /66   Pulse 71   Temp 36.3 °C (97.3 °F)   Wt 67.2 kg (148 lb 3.2 oz)   SpO2 97%   BMI 30.45 kg/m²   Lab Results   Component Value Date    WBC 8.1 12/11/2017    HGB 13.8 12/11/2017    HCT 39.0 12/11/2017     12/11/2017    CHOL 168 01/22/2018    TRIG 118 01/22/2018    HDL 57.3 01/22/2018    ALT 39 01/22/2018    AST 22 01/22/2018     01/22/2018    K 4.1 01/22/2018     01/22/2018    CREATININE 0.73 01/22/2018    BUN 21 01/22/2018    CO2 33 (H) 01/22/2018    INR 1.0 11/30/2017           Physical Exam  Vitals reviewed.   Constitutional:       Appearance: Normal appearance. She is obese.   HENT:      Head: Normocephalic and atraumatic.      Mouth/Throat:      Pharynx: No posterior oropharyngeal erythema.   Eyes:      General: No scleral icterus.      Conjunctiva/sclera: Conjunctivae normal.      Pupils: Pupils are equal, round, and reactive to light.   Cardiovascular:      Rate and Rhythm: Normal rate and regular rhythm.      Heart sounds: Normal heart sounds.   Pulmonary:      Effort: No respiratory distress.      Breath sounds: No wheezing.   Abdominal:      General: Abdomen is flat. Bowel sounds are normal. There is no distension.      Palpations: Abdomen is soft. There is no mass.      Tenderness: There is no abdominal tenderness. There is no rebound.   Musculoskeletal:         General: Normal range of motion.      Cervical back: Normal range of motion and neck supple.   Skin:     General: Skin is warm and dry.   Neurological:      General: No focal deficit present.      Mental Status: She is alert and oriented to person, place, and time. Mental status is at baseline.   Psychiatric:         Mood and Affect: Mood normal.         Behavior: Behavior normal.         Thought Content: Thought content normal.         Judgment: Judgment normal.         Problem List Items Addressed This Visit             ICD-10-CM    Benign essential hypertension I10    CAD (coronary artery disease) I25.10    Depression F32.A    Relevant Medications    sertraline (Zoloft) 100 mg tablet    Gastroesophageal reflux disease K21.9     Other Visit Diagnoses         Codes    Encounter to discuss test results    -  Primary Z71.2    Type 2 diabetes mellitus with other specified complication, without long-term current use of insulin (Multi)     E11.69    Relevant Medications    semaglutide (Ozempic) 0.25 mg or 0.5 mg (2 mg/3 mL) pen injector    Other Relevant Orders    Referral to Podiatry    Hypercholesteremia     E78.00    Chronic pain of both feet     M79.671, G89.29, M79.672    Relevant Orders    Referral to Podiatry    Class 1 obesity due to excess calories with serious comorbidity and body mass index (BMI) of 30.0 to 30.9 in adult     E66.09, Z68.30          Assessment/Plan     Labs  rev'd         Ear issues due to stress of clenching teeth per dentist      ENT rev'd ears cleaned     depression  / frustrated  on rx no side effects    from her spouse and moved in with her daughter.  Increased zoloft 200 mg daily     hypertension stable on rx no side effects  Limit NSAIDs  Follow BP closely     Diabetes mellitus 2 new onset  HbA1c 7.8  5-24  Start Ozempic 0.25 mg weekly  Risk / benefits rev'd  Check fasting blood sugars and record  Chronic feet pain  podiatry consult     h/o gallstones occasional pain     Irritated hemorrhoids and bloating. better on rx     MI,S/P PTCA with stent, arrhythmia / history of cardiac arrest stable on rx  sees cardio     arthritis needs tramadol prn     cholesterol high on rx no side effects  follow     GERD on rx no side effects  Was bad when ate poorly     Diet and exercise reviewed     do mammogram, dexa not done yet     GYN on follow up      Mammogram 4-18 /pt declined  Dexa 11-16 slight osteopenia  Cologuard normal 3-24  CT chest lung cancer screening n/a  GYN 2016  immunizations rev'd RSV, shingrix  BMI 30.4    Follow up 1 month

## 2024-06-24 DIAGNOSIS — E11.69 TYPE 2 DIABETES MELLITUS WITH OTHER SPECIFIED COMPLICATION, WITHOUT LONG-TERM CURRENT USE OF INSULIN (MULTI): ICD-10-CM

## 2024-06-24 RX ORDER — DEXTROSE 4 G
TABLET,CHEWABLE ORAL
COMMUNITY
End: 2024-06-24 | Stop reason: SDUPTHER

## 2024-06-24 RX ORDER — BLOOD-GLUCOSE METER
KIT MISCELLANEOUS
COMMUNITY
End: 2024-06-24 | Stop reason: SDUPTHER

## 2024-06-24 RX ORDER — LANCETS
EACH MISCELLANEOUS
COMMUNITY
End: 2024-06-24 | Stop reason: ALTCHOICE

## 2024-06-24 RX ORDER — LANCETS 28 GAUGE
1 EACH MISCELLANEOUS AS NEEDED
COMMUNITY
End: 2024-06-24 | Stop reason: SDUPTHER

## 2024-06-25 RX ORDER — BLOOD-GLUCOSE METER
1 KIT MISCELLANEOUS DAILY
Qty: 1 EACH | Refills: 0 | Status: SHIPPED | OUTPATIENT
Start: 2024-06-25 | End: 2025-06-25

## 2024-06-25 RX ORDER — BLOOD-GLUCOSE METER
1 KIT MISCELLANEOUS DAILY
Qty: 100 STRIP | Refills: 1 | Status: SHIPPED | OUTPATIENT
Start: 2024-06-25

## 2024-06-25 RX ORDER — LANCETS 28 GAUGE
1 EACH MISCELLANEOUS DAILY
Qty: 100 EACH | Refills: 1 | Status: SHIPPED | OUTPATIENT
Start: 2024-06-25

## 2024-07-02 ENCOUNTER — APPOINTMENT (OUTPATIENT)
Dept: PODIATRY | Facility: CLINIC | Age: 64
End: 2024-07-02
Payer: COMMERCIAL

## 2024-07-02 DIAGNOSIS — M79.672 CHRONIC PAIN OF BOTH FEET: ICD-10-CM

## 2024-07-02 DIAGNOSIS — G89.29 CHRONIC PAIN OF BOTH FEET: ICD-10-CM

## 2024-07-02 DIAGNOSIS — M79.671 CHRONIC PAIN OF BOTH FEET: ICD-10-CM

## 2024-07-02 DIAGNOSIS — E11.69 TYPE 2 DIABETES MELLITUS WITH OTHER SPECIFIED COMPLICATION, WITHOUT LONG-TERM CURRENT USE OF INSULIN (MULTI): ICD-10-CM

## 2024-07-02 DIAGNOSIS — M19.079 1ST MTP ARTHRITIS: Primary | ICD-10-CM

## 2024-07-02 PROCEDURE — 3008F BODY MASS INDEX DOCD: CPT | Performed by: PODIATRIST

## 2024-07-02 PROCEDURE — 99203 OFFICE O/P NEW LOW 30 MIN: CPT | Performed by: PODIATRIST

## 2024-07-02 NOTE — PROGRESS NOTES
This is a 63 y.o. female new patient for DM deedee fonseca    History of Present Illness:   Patient states they are here for Dm exam  Denies NTB to feet  Most recent A1C is 7.8  Unable to safely trim nails on own      Past Medical History  Past Medical History:   Diagnosis Date    Acute bronchitis, unspecified 06/06/2017    Bronchitis, acute    Calculus of gallbladder without cholecystitis without obstruction 05/17/2018    Cholelithiases    Encounter for screening mammogram for malignant neoplasm of breast 03/22/2016    Visit for screening mammogram    Hyperglycemia, unspecified 11/01/2022    Hyperglycemia    Other ventricular tachycardia (Multi) 12/27/2017    Polymorphic ventricular tachycardia    Pain in right shoulder 04/20/2017    Right shoulder pain    Personal history of other infectious and parasitic diseases     History of chicken pox    Personal history of peptic ulcer disease 12/03/2015    History of peptic ulcer    Unspecified osteoarthritis, unspecified site 05/11/2017    Osteoarthritis       Medications and Allergies have been reviewed.    Review Of Systems:  GENERAL: No weight loss, malaise or fevers.  HEENT: Negative for frequent or significant headaches,   RESPIRATORY: Negative for cough, wheezing or shortness of breath.  CARDIOVASCULAR: Negative for chest pain, leg swelling or palpitations.    Physical Exam:  Patient is a pleasant, cooperative, well developed 63 y.o.  adult female. The patient is alert and oriented to time, place and person.   Patient has normal affect and mood.    Examination of Both Lower Extremities:   Objective:   Vasc: DP and PT pulses are palpable bilateral.  CFT is less than 3 seconds bilateral.  Skin temperature is warm to cool proximal to distal bilateral.      Neuro: Vibratory, light touch and proprioception are intact bilateral.      Derm: Nails 1-5 bilateral are intact.  Skin is supple with normal texture and turgor noted.  Webspaces are clean, dry and intact bilateral.   There are no hyperkeratoses, ulcerations, verruca or other lesions noted.      Ortho: Muscle strength is 5/5 for all pedal groups tested.      1. 1st MTP arthritis  Custom Orthotics    Diabetic Shoe For Density      2. Chronic pain of both feet  Referral to Podiatry    Custom Orthotics    Diabetic Shoe For Density      3. Type 2 diabetes mellitus with other specified complication, without long-term current use of insulin (Multi)  Referral to Podiatry    Custom Orthotics    Diabetic Shoe For Density            Patient educated on proper diabetic foot care.  A1C revd. 7.8  Gave rx for DM shoes and inserts  Gave rx for custom inserts  Recommend powersteps  Recommend wearing shoes in the house to help with support  Low pedal risk noted  Patient to follow up in 12 mos or sooner if any problems arise.   Patient was in agreement to this plan. All questions answered.      Lucretia Elliott DPM  [phone]  [facility]  Fax: [phone]

## 2024-07-15 DIAGNOSIS — E78.00 HYPERCHOLESTEROLEMIA: ICD-10-CM

## 2024-07-15 DIAGNOSIS — I10 BENIGN HYPERTENSION: ICD-10-CM

## 2024-07-15 DIAGNOSIS — J30.9 ALLERGIC RHINITIS, UNSPECIFIED SEASONALITY, UNSPECIFIED TRIGGER: ICD-10-CM

## 2024-07-15 DIAGNOSIS — J01.11 ACUTE RECURRENT FRONTAL SINUSITIS: ICD-10-CM

## 2024-07-15 RX ORDER — ROSUVASTATIN CALCIUM 40 MG/1
40 TABLET, COATED ORAL DAILY
Qty: 90 TABLET | Refills: 0 | Status: SHIPPED | OUTPATIENT
Start: 2024-07-15

## 2024-07-15 RX ORDER — FLUTICASONE PROPIONATE 50 MCG
1 SPRAY, SUSPENSION (ML) NASAL DAILY
Qty: 48 G | Refills: 0 | Status: SHIPPED | OUTPATIENT
Start: 2024-07-15 | End: 2024-10-13

## 2024-07-15 RX ORDER — AMLODIPINE BESYLATE 10 MG/1
10 TABLET ORAL DAILY
Qty: 90 TABLET | Refills: 0 | Status: SHIPPED | OUTPATIENT
Start: 2024-07-15

## 2024-07-15 RX ORDER — VALSARTAN AND HYDROCHLOROTHIAZIDE 160; 12.5 MG/1; MG/1
1 TABLET, FILM COATED ORAL DAILY
Qty: 90 TABLET | Refills: 0 | Status: SHIPPED | OUTPATIENT
Start: 2024-07-15

## 2024-07-22 ENCOUNTER — APPOINTMENT (OUTPATIENT)
Dept: PRIMARY CARE | Facility: CLINIC | Age: 64
End: 2024-07-22
Payer: COMMERCIAL

## 2024-08-12 ENCOUNTER — APPOINTMENT (OUTPATIENT)
Dept: PRIMARY CARE | Facility: CLINIC | Age: 64
End: 2024-08-12
Payer: COMMERCIAL

## 2024-08-12 VITALS
WEIGHT: 141.6 LBS | SYSTOLIC BLOOD PRESSURE: 120 MMHG | BODY MASS INDEX: 29.09 KG/M2 | HEART RATE: 80 BPM | DIASTOLIC BLOOD PRESSURE: 64 MMHG | TEMPERATURE: 98 F | OXYGEN SATURATION: 94 %

## 2024-08-12 DIAGNOSIS — I10 BENIGN ESSENTIAL HYPERTENSION: ICD-10-CM

## 2024-08-12 DIAGNOSIS — E11.69 TYPE 2 DIABETES MELLITUS WITH OTHER SPECIFIED COMPLICATION, WITHOUT LONG-TERM CURRENT USE OF INSULIN (MULTI): Primary | ICD-10-CM

## 2024-08-12 DIAGNOSIS — I25.10 CORONARY ARTERY DISEASE INVOLVING NATIVE CORONARY ARTERY OF NATIVE HEART WITHOUT ANGINA PECTORIS: ICD-10-CM

## 2024-08-12 DIAGNOSIS — K21.9 GASTROESOPHAGEAL REFLUX DISEASE, UNSPECIFIED WHETHER ESOPHAGITIS PRESENT: ICD-10-CM

## 2024-08-12 DIAGNOSIS — F41.1 GENERALIZED ANXIETY DISORDER: ICD-10-CM

## 2024-08-12 PROCEDURE — 99214 OFFICE O/P EST MOD 30 MIN: CPT | Performed by: INTERNAL MEDICINE

## 2024-08-12 PROCEDURE — 3078F DIAST BP <80 MM HG: CPT | Performed by: INTERNAL MEDICINE

## 2024-08-12 PROCEDURE — 1036F TOBACCO NON-USER: CPT | Performed by: INTERNAL MEDICINE

## 2024-08-12 PROCEDURE — 3074F SYST BP LT 130 MM HG: CPT | Performed by: INTERNAL MEDICINE

## 2024-08-12 RX ORDER — SEMAGLUTIDE 0.68 MG/ML
0.25 INJECTION, SOLUTION SUBCUTANEOUS
Qty: 3 ML | Refills: 1 | Status: SHIPPED | OUTPATIENT
Start: 2024-08-12

## 2024-08-12 NOTE — PROGRESS NOTES
Subjective   Patient ID: Livia Jordan is a 63 y.o. female who presents for 1 month follow up .  HPI    Follow up  ozempic    Diabetes mellitus 2 new onset  HbA1c 7.8  5-24  On Ozempic 0.25 mg weekly  Check fasting blood sugars and record  Coming down  Accidentally started 0.5  now back on 0.25 weekly  Doing well / lost weight  Chronic feet pain  podiatry consult        Ear issues due to stress of clenching teeth per dentist      ENT rev'd ears cleaned     depression better  on rx no side effects    from her spouse and moved in with her daughter.  on zoloft 200 mg daily     hypertension stable on rx no side effects  Limit NSAIDs  Follow BP closely     h/o gallstones occasional pain     Irritated hemorrhoids and bloating. better on rx     MI,S/P PTCA with stent, arrhythmia / history of cardiac arrest stable on rx  sees cardio     arthritis needs tramadol prn     cholesterol high on rx no side effects  follow     GERD on rx no side effects  Was bad when ate poorly     Diet and exercise reviewed     do mammogram, dexa not done yet     GYN on follow up    Review of Systems   All other systems reviewed and are negative.      Objective   /64   Pulse 80   Temp 36.7 °C (98 °F)   Wt 64.2 kg (141 lb 9.6 oz)   SpO2 94%   BMI 29.09 kg/m²   Lab Results   Component Value Date    WBC 8.1 12/11/2017    HGB 13.8 12/11/2017    HCT 39.0 12/11/2017     12/11/2017    CHOL 168 01/22/2018    TRIG 118 01/22/2018    HDL 57.3 01/22/2018    ALT 39 01/22/2018    AST 22 01/22/2018     01/22/2018    K 4.1 01/22/2018     01/22/2018    CREATININE 0.73 01/22/2018    BUN 21 01/22/2018    CO2 33 (H) 01/22/2018    INR 1.0 11/30/2017           Physical Exam  Vitals reviewed.   Constitutional:       Appearance: Normal appearance. She is normal weight.   HENT:      Head: Normocephalic and atraumatic.      Mouth/Throat:      Pharynx: No posterior oropharyngeal erythema.   Eyes:      General: No scleral  icterus.     Conjunctiva/sclera: Conjunctivae normal.      Pupils: Pupils are equal, round, and reactive to light.   Cardiovascular:      Rate and Rhythm: Normal rate and regular rhythm.      Heart sounds: Normal heart sounds.   Pulmonary:      Effort: No respiratory distress.      Breath sounds: No wheezing.   Abdominal:      General: Abdomen is flat. Bowel sounds are normal. There is no distension.      Palpations: Abdomen is soft. There is no mass.      Tenderness: There is no abdominal tenderness. There is no rebound.   Musculoskeletal:         General: Normal range of motion.      Cervical back: Normal range of motion and neck supple.   Skin:     General: Skin is warm and dry.   Neurological:      General: No focal deficit present.      Mental Status: She is alert and oriented to person, place, and time. Mental status is at baseline.   Psychiatric:         Mood and Affect: Mood normal.         Behavior: Behavior normal.         Thought Content: Thought content normal.         Judgment: Judgment normal.         Problem List Items Addressed This Visit             ICD-10-CM    Anxiety disorder F41.9    Benign essential hypertension I10    CAD (coronary artery disease) I25.10    Gastroesophageal reflux disease K21.9     Other Visit Diagnoses         Codes    Type 2 diabetes mellitus with other specified complication, without long-term current use of insulin (Multi)    -  Primary E11.69    Relevant Medications    semaglutide (Ozempic) 0.25 mg or 0.5 mg (2 mg/3 mL) pen injector    BMI 29.0-29.9,adult     Z68.29          Assessment/Plan   Follow up  ozempic    Diabetes mellitus 2 new onset  HbA1c 7.8  5-24  On Ozempic 0.25 mg weekly  Check fasting blood sugars and record  Coming down  Accidentally started 0.5  now back on 0.25 weekly  Doing well / lost weight  Chronic feet pain  podiatry consult        Ear issues due to stress of clenching teeth per dentist      ENT rev'd ears cleaned     depression better  on rx no  side effects    from her spouse and moved in with her daughter.  on zoloft 200 mg daily     hypertension stable on rx no side effects  Limit NSAIDs  Follow BP closely     h/o gallstones occasional pain     Irritated hemorrhoids and bloating. better on rx     MI,S/P PTCA with stent, arrhythmia / history of cardiac arrest stable on rx  sees cardio     arthritis needs tramadol prn     cholesterol high on rx no side effects  follow     GERD on rx no side effects  Was bad when ate poorly     Diet and exercise reviewed     do mammogram, dexa not done yet     GYN on follow up    Mammogram 4-18 /pt declined  Dexa 11-16 slight osteopenia  Cologuard normal 3-24  CT chest lung cancer screening n/a  GYN 2016  immunizations rev'd RSV, shingrix  BMI 29    Follow up 3 months

## 2024-09-09 DIAGNOSIS — I10 BENIGN ESSENTIAL HYPERTENSION: ICD-10-CM

## 2024-09-09 DIAGNOSIS — K21.9 GASTROESOPHAGEAL REFLUX DISEASE, UNSPECIFIED WHETHER ESOPHAGITIS PRESENT: ICD-10-CM

## 2024-09-09 RX ORDER — METOPROLOL SUCCINATE 25 MG/1
25 TABLET, EXTENDED RELEASE ORAL DAILY
Qty: 90 TABLET | Refills: 0 | Status: SHIPPED | OUTPATIENT
Start: 2024-09-09

## 2024-09-09 RX ORDER — PANTOPRAZOLE SODIUM 40 MG/1
40 TABLET, DELAYED RELEASE ORAL DAILY
Qty: 90 TABLET | Refills: 0 | Status: SHIPPED | OUTPATIENT
Start: 2024-09-09

## 2024-10-14 ENCOUNTER — TELEPHONE (OUTPATIENT)
Dept: PRIMARY CARE | Facility: CLINIC | Age: 64
End: 2024-10-14
Payer: COMMERCIAL

## 2024-10-14 DIAGNOSIS — I10 BENIGN HYPERTENSION: ICD-10-CM

## 2024-10-14 DIAGNOSIS — E78.00 HYPERCHOLESTEROLEMIA: ICD-10-CM

## 2024-10-14 RX ORDER — ROSUVASTATIN CALCIUM 40 MG/1
40 TABLET, COATED ORAL DAILY
Qty: 90 TABLET | Refills: 0 | Status: SHIPPED | OUTPATIENT
Start: 2024-10-14

## 2024-10-14 RX ORDER — VALSARTAN AND HYDROCHLOROTHIAZIDE 160; 12.5 MG/1; MG/1
1 TABLET, FILM COATED ORAL DAILY
Qty: 90 TABLET | Refills: 0 | Status: SHIPPED | OUTPATIENT
Start: 2024-10-14

## 2024-10-14 RX ORDER — AMLODIPINE BESYLATE 10 MG/1
10 TABLET ORAL DAILY
Qty: 90 TABLET | Refills: 0 | Status: SHIPPED | OUTPATIENT
Start: 2024-10-14

## 2024-11-12 ENCOUNTER — APPOINTMENT (OUTPATIENT)
Dept: PRIMARY CARE | Facility: CLINIC | Age: 64
End: 2024-11-12
Payer: COMMERCIAL

## 2024-11-18 DIAGNOSIS — I10 BENIGN ESSENTIAL HYPERTENSION: ICD-10-CM

## 2024-11-18 DIAGNOSIS — J01.11 ACUTE RECURRENT FRONTAL SINUSITIS: ICD-10-CM

## 2024-11-18 DIAGNOSIS — J30.9 ALLERGIC RHINITIS, UNSPECIFIED SEASONALITY, UNSPECIFIED TRIGGER: ICD-10-CM

## 2024-11-18 DIAGNOSIS — K21.9 GASTROESOPHAGEAL REFLUX DISEASE, UNSPECIFIED WHETHER ESOPHAGITIS PRESENT: ICD-10-CM

## 2024-11-18 RX ORDER — FLUTICASONE PROPIONATE 50 MCG
1 SPRAY, SUSPENSION (ML) NASAL DAILY
Qty: 48 G | Refills: 0 | Status: SHIPPED | OUTPATIENT
Start: 2024-11-18 | End: 2025-02-16

## 2024-11-18 RX ORDER — METOPROLOL SUCCINATE 25 MG/1
25 TABLET, EXTENDED RELEASE ORAL DAILY
Qty: 90 TABLET | Refills: 0 | Status: SHIPPED | OUTPATIENT
Start: 2024-11-18

## 2024-11-18 RX ORDER — PANTOPRAZOLE SODIUM 40 MG/1
40 TABLET, DELAYED RELEASE ORAL DAILY
Qty: 90 TABLET | Refills: 0 | Status: SHIPPED | OUTPATIENT
Start: 2024-11-18

## 2024-12-09 ENCOUNTER — APPOINTMENT (OUTPATIENT)
Dept: PRIMARY CARE | Facility: CLINIC | Age: 64
End: 2024-12-09
Payer: COMMERCIAL

## 2025-01-09 ENCOUNTER — APPOINTMENT (OUTPATIENT)
Dept: PRIMARY CARE | Facility: CLINIC | Age: 65
End: 2025-01-09
Payer: COMMERCIAL

## 2025-01-09 VITALS
DIASTOLIC BLOOD PRESSURE: 68 MMHG | HEART RATE: 69 BPM | WEIGHT: 137.8 LBS | TEMPERATURE: 97.7 F | OXYGEN SATURATION: 98 % | BODY MASS INDEX: 28.31 KG/M2 | SYSTOLIC BLOOD PRESSURE: 122 MMHG

## 2025-01-09 DIAGNOSIS — F32.A DEPRESSION, UNSPECIFIED DEPRESSION TYPE: ICD-10-CM

## 2025-01-09 DIAGNOSIS — I25.10 CORONARY ARTERY DISEASE INVOLVING NATIVE CORONARY ARTERY OF NATIVE HEART WITHOUT ANGINA PECTORIS: ICD-10-CM

## 2025-01-09 DIAGNOSIS — E78.00 HYPERCHOLESTEROLEMIA: ICD-10-CM

## 2025-01-09 DIAGNOSIS — M25.512 CHRONIC LEFT SHOULDER PAIN: Primary | ICD-10-CM

## 2025-01-09 DIAGNOSIS — E11.69 TYPE 2 DIABETES MELLITUS WITH OTHER SPECIFIED COMPLICATION, WITHOUT LONG-TERM CURRENT USE OF INSULIN: ICD-10-CM

## 2025-01-09 DIAGNOSIS — G89.29 CHRONIC LEFT SHOULDER PAIN: Primary | ICD-10-CM

## 2025-01-09 DIAGNOSIS — I10 BENIGN HYPERTENSION: ICD-10-CM

## 2025-01-09 PROCEDURE — 3074F SYST BP LT 130 MM HG: CPT | Performed by: INTERNAL MEDICINE

## 2025-01-09 PROCEDURE — 3078F DIAST BP <80 MM HG: CPT | Performed by: INTERNAL MEDICINE

## 2025-01-09 PROCEDURE — 99214 OFFICE O/P EST MOD 30 MIN: CPT | Performed by: INTERNAL MEDICINE

## 2025-01-09 PROCEDURE — 1036F TOBACCO NON-USER: CPT | Performed by: INTERNAL MEDICINE

## 2025-01-09 RX ORDER — AMLODIPINE BESYLATE 10 MG/1
10 TABLET ORAL DAILY
Qty: 90 TABLET | Refills: 0 | Status: SHIPPED | OUTPATIENT
Start: 2025-01-09

## 2025-01-09 RX ORDER — VALSARTAN AND HYDROCHLOROTHIAZIDE 160; 12.5 MG/1; MG/1
1 TABLET, FILM COATED ORAL DAILY
Qty: 90 TABLET | Refills: 0 | Status: SHIPPED | OUTPATIENT
Start: 2025-01-09

## 2025-01-09 RX ORDER — SERTRALINE HYDROCHLORIDE 100 MG/1
100 TABLET, FILM COATED ORAL 2 TIMES DAILY
Qty: 180 TABLET | Refills: 0 | Status: SHIPPED | OUTPATIENT
Start: 2025-01-09

## 2025-01-09 RX ORDER — ROSUVASTATIN CALCIUM 40 MG/1
40 TABLET, COATED ORAL DAILY
Qty: 90 TABLET | Refills: 0 | Status: SHIPPED | OUTPATIENT
Start: 2025-01-09

## 2025-01-09 NOTE — PROGRESS NOTES
Subjective   Patient ID: Livia Jordan is a 64 y.o. female who presents for Follow-up and Shoulder Pain (X 1 month - left shoulder //Has been taking OTC medication ).  Shoulder Pain           Routine follow up         Left shoulder pain chronic       Ortho consult     Diabetes mellitus 2 new onset  HbA1c 7.8  5-24  Off  Ozempic due to insurance  Check fasting blood sugars and record  Coming down  Chronic feet pain  podiatry consult         Ear issues due to stress of clenching teeth per dentist      ENT rev'd ears cleaned     depression better  on rx no side effects    from her spouse and moved in with her daughter.  on zoloft 200 mg daily     hypertension stable on rx no side effects  Limit NSAIDs  Follow BP closely     h/o gallstones occasional pain     Irritated hemorrhoids and bloating. better on rx     MI,S/P PTCA with stent, arrhythmia / history of cardiac arrest stable on rx  sees cardio     arthritis needs tramadol prn     cholesterol high on rx no side effects  follow     GERD on rx no side effects  Was bad when ate poorly     Diet and exercise reviewed     do mammogram, dexa not done yet     GYN on follow up    Review of Systems   All other systems reviewed and are negative.      Objective   /68   Pulse 69   Temp 36.5 °C (97.7 °F)   Wt 62.5 kg (137 lb 12.8 oz)   SpO2 98%   BMI 28.31 kg/m²   Lab Results   Component Value Date    WBC 8.1 12/11/2017    HGB 13.8 12/11/2017    HCT 39.0 12/11/2017     12/11/2017    CHOL 168 01/22/2018    TRIG 118 01/22/2018    HDL 57.3 01/22/2018    ALT 39 01/22/2018    AST 22 01/22/2018     01/22/2018    K 4.1 01/22/2018     01/22/2018    CREATININE 0.73 01/22/2018    BUN 21 01/22/2018    CO2 33 (H) 01/22/2018    INR 1.0 11/30/2017           Physical Exam  Vitals reviewed.   Constitutional:       Appearance: Normal appearance. She is normal weight.   HENT:      Head: Normocephalic and atraumatic.      Mouth/Throat:      Pharynx: No  posterior oropharyngeal erythema.   Eyes:      General: No scleral icterus.     Conjunctiva/sclera: Conjunctivae normal.      Pupils: Pupils are equal, round, and reactive to light.   Cardiovascular:      Rate and Rhythm: Normal rate and regular rhythm.      Heart sounds: Normal heart sounds.   Pulmonary:      Effort: No respiratory distress.      Breath sounds: No wheezing.   Abdominal:      General: Abdomen is flat. Bowel sounds are normal. There is no distension.      Palpations: Abdomen is soft. There is no mass.      Tenderness: There is no abdominal tenderness. There is no rebound.   Musculoskeletal:         General: Normal range of motion.      Cervical back: Normal range of motion and neck supple.   Skin:     General: Skin is warm and dry.   Neurological:      General: No focal deficit present.      Mental Status: She is alert and oriented to person, place, and time. Mental status is at baseline.   Psychiatric:         Mood and Affect: Mood normal.         Behavior: Behavior normal.         Thought Content: Thought content normal.         Judgment: Judgment normal.         Problem List Items Addressed This Visit             ICD-10-CM    CAD (coronary artery disease) I25.10    Relevant Medications    amLODIPine (Norvasc) 10 mg tablet    Depression F32.A    Relevant Medications    sertraline (Zoloft) 100 mg tablet    Benign hypertension I10    Relevant Medications    valsartan-hydrochlorothiazide (Diovan-HCT) 160-12.5 mg tablet    amLODIPine (Norvasc) 10 mg tablet    Hypercholesterolemia E78.00    Relevant Medications    rosuvastatin (Crestor) 40 mg tablet     Other Visit Diagnoses         Codes    Chronic left shoulder pain    -  Primary M25.512, G89.29    Relevant Orders    Referral to Orthopaedic Surgery    Type 2 diabetes mellitus with other specified complication, without long-term current use of insulin     E11.69    BMI 28.0-28.9,adult     Z68.28          Assessment/Plan          Left shoulder pain  chronic       Ortho consult     Diabetes mellitus 2 new onset  HbA1c 7.8  5-24  Off  Ozempic due to insurance  Check fasting blood sugars and record  Coming down  Chronic feet pain  podiatry consult         Ear issues due to stress of clenching teeth per dentist      ENT rev'd ears cleaned     depression better  on rx no side effects     moved in with her daughter.  on zoloft 200 mg daily     hypertension stable on rx no side effects  Limit NSAIDs  Follow BP closely     h/o gallstones occasional pain     Irritated hemorrhoids and bloating. better on rx     MI,S/P PTCA with stent, arrhythmia / history of cardiac arrest stable on rx  sees cardio     arthritis needs tramadol prn     cholesterol high on rx no side effects  follow     GERD on rx no side effects  Was bad when ate poorly     Diet and exercise reviewed     do mammogram, dexa not done yet     GYN on follow up    Mammogram 4-18 /pt declined  Dexa 11-16 slight osteopenia  Cologuard normal 3-24  CT chest lung cancer screening n/a  GYN 2016  immunizations rev'd RSV, shingrix  BMI 28.3    Following up with closer PCP due to poor transportation

## 2025-01-28 ENCOUNTER — OFFICE VISIT (OUTPATIENT)
Dept: CARDIOLOGY | Facility: CLINIC | Age: 65
End: 2025-01-28
Payer: COMMERCIAL

## 2025-01-28 VITALS
WEIGHT: 137 LBS | BODY MASS INDEX: 27.62 KG/M2 | HEIGHT: 59 IN | DIASTOLIC BLOOD PRESSURE: 72 MMHG | HEART RATE: 69 BPM | SYSTOLIC BLOOD PRESSURE: 115 MMHG | OXYGEN SATURATION: 95 %

## 2025-01-28 DIAGNOSIS — I10 BENIGN ESSENTIAL HYPERTENSION: ICD-10-CM

## 2025-01-28 DIAGNOSIS — I25.10 CORONARY ARTERY DISEASE INVOLVING NATIVE CORONARY ARTERY OF NATIVE HEART WITHOUT ANGINA PECTORIS: Primary | ICD-10-CM

## 2025-01-28 DIAGNOSIS — R94.31 ABNORMAL EKG: ICD-10-CM

## 2025-01-28 DIAGNOSIS — E78.00 HYPERCHOLESTEROLEMIA: ICD-10-CM

## 2025-01-28 PROBLEM — Z95.5 H/O HEART ARTERY STENT: Status: ACTIVE | Noted: 2025-01-28

## 2025-01-28 PROCEDURE — 93005 ELECTROCARDIOGRAM TRACING: CPT | Performed by: INTERNAL MEDICINE

## 2025-01-28 PROCEDURE — 93010 ELECTROCARDIOGRAM REPORT: CPT | Performed by: INTERNAL MEDICINE

## 2025-01-28 PROCEDURE — 99214 OFFICE O/P EST MOD 30 MIN: CPT | Mod: 25 | Performed by: INTERNAL MEDICINE

## 2025-01-28 PROCEDURE — 1036F TOBACCO NON-USER: CPT | Performed by: INTERNAL MEDICINE

## 2025-01-28 PROCEDURE — 3078F DIAST BP <80 MM HG: CPT | Performed by: INTERNAL MEDICINE

## 2025-01-28 PROCEDURE — 3008F BODY MASS INDEX DOCD: CPT | Performed by: INTERNAL MEDICINE

## 2025-01-28 PROCEDURE — 3074F SYST BP LT 130 MM HG: CPT | Performed by: INTERNAL MEDICINE

## 2025-01-28 PROCEDURE — 99214 OFFICE O/P EST MOD 30 MIN: CPT | Performed by: INTERNAL MEDICINE

## 2025-01-28 ASSESSMENT — PATIENT HEALTH QUESTIONNAIRE - PHQ9
1. LITTLE INTEREST OR PLEASURE IN DOING THINGS: NOT AT ALL
2. FEELING DOWN, DEPRESSED OR HOPELESS: NOT AT ALL
SUM OF ALL RESPONSES TO PHQ9 QUESTIONS 1 AND 2: 0

## 2025-01-28 NOTE — PROGRESS NOTES
"Chief Complaint:   No chief complaint on file.     History Of Present Illness:    Livia Jordan is a 64 y.o. female presenting for follow-up.  Doing well from a cardiac standpoint.  Denies any angina, shortness of breath, dizziness, lower extremity swelling, palpitations.  Compliant with medications.     Last Recorded Vitals:  Vitals:    25 1009   BP: 115/72   BP Location: Right arm   Patient Position: Sitting   Pulse: 69   SpO2: 95%   Weight: 62.1 kg (137 lb)   Height: 1.499 m (4' 11\")       Past Medical History:  She has a past medical history of Acute bronchitis, unspecified (2017), Calculus of gallbladder without cholecystitis without obstruction (2018), Encounter for screening mammogram for malignant neoplasm of breast (2016), Hyperglycemia, unspecified (2022), Other ventricular tachycardia (2017), Pain in right shoulder (2017), Personal history of other infectious and parasitic diseases, Personal history of peptic ulcer disease (2015), and Unspecified osteoarthritis, unspecified site (2017).    Past Surgical History:  She has a past surgical history that includes Carpal tunnel release (2015); Coronary angioplasty with stent (2017); Colonoscopy (2016); Other surgical history (2016);  section, classic (2016); Tubal ligation (2016); and Tonsillectomy (2016).      Social History:  She reports that she quit smoking about 30 years ago. Her smoking use included cigarettes. She has been exposed to tobacco smoke. She has never used smokeless tobacco. She reports that she does not currently use alcohol. She reports that she does not use drugs.    Family History:  No family history on file.     Allergies:  Amiodarone and Enalapril maleate    Outpatient Medications:  Current Outpatient Medications   Medication Instructions    amLODIPine (NORVASC) 10 mg, oral, Daily    aspirin 81 mg EC tablet 1 tablet, Daily    " cholecalciferol (Vitamin D-3) 125 MCG (5000 UT) capsule Take by mouth.    fluticasone (Flonase) 50 mcg/actuation nasal spray 1 spray, Each Nostril, Daily    FreeStyle lancets 28 gauge 1 each, miscellaneous, Daily, Use as instructed    FreeStyle Lite Meter kit 1 each, miscellaneous, Daily    FreeStyle Lite Strips strip 1 strip, miscellaneous, Daily    loratadine (CLARITIN) 10 mg, Daily    metoprolol succinate XL (TOPROL-XL) 25 mg, oral, Daily    Ozempic 0.25 mg, subcutaneous, Every 7 days    pantoprazole (PROTONIX) 40 mg, oral, Daily    rosuvastatin (CRESTOR) 40 mg, oral, Daily    sertraline (ZOLOFT) 100 mg, oral, 2 times daily    valsartan-hydrochlorothiazide (Diovan-HCT) 160-12.5 mg tablet 1 tablet, oral, Daily       Physical Exam:  Constitutional:       Appearance: Healthy appearance. Not in distress.   Neck:      Vascular: No JVR. JVD normal.   Pulmonary:      Effort: Pulmonary effort is normal.      Breath sounds: Normal breath sounds. No wheezing. No rhonchi. No rales.   Chest:      Chest wall: Not tender to palpatation.   Cardiovascular:      Normal rate. Regular rhythm.      Murmurs: There is no murmur.   Edema:     Peripheral edema absent.   Abdominal:      General: Bowel sounds are normal.      Palpations: Abdomen is soft.      Tenderness: There is no abdominal tenderness.   Musculoskeletal: Normal range of motion. Skin:     General: Skin is warm and dry.   Neurological:      General: No focal deficit present.      Mental Status: Alert and oriented to person, place and time.           Last Labs:  CBC -  Lab Results   Component Value Date    WBC 8.1 12/11/2017    HGB 13.8 12/11/2017    HCT 39.0 12/11/2017    MCV 91 12/11/2017     12/11/2017       CMP -  Lab Results   Component Value Date    CALCIUM 9.6 01/22/2018    PROT 6.8 01/22/2018    ALBUMIN 4.5 01/22/2018    AST 22 01/22/2018    ALT 39 01/22/2018    ALKPHOS 93 01/22/2018    BILITOT 0.6 01/22/2018       LIPID PANEL -   Lab Results   Component  "Value Date    CHOL 168 01/22/2018    TRIG 118 01/22/2018    HDL 57.3 01/22/2018    CHHDL 2.9 01/22/2018    LDLF 87 01/22/2018    VLDL 24 01/22/2018       RENAL FUNCTION PANEL -   Lab Results   Component Value Date    GLUCOSE 99 01/22/2018     01/22/2018    K 4.1 01/22/2018     01/22/2018    CO2 33 (H) 01/22/2018    ANIONGAP 10 01/22/2018    BUN 21 01/22/2018    CREATININE 0.73 01/22/2018    CALCIUM 9.6 01/22/2018    ALBUMIN 4.5 01/22/2018        No results found for: \"BNP\", \"HGBA1C\"    Last Cardiology Tests:  ECG independently reviewed from today: Sinus rhythm, rate 68     Cath 11/30/17:   1. VF arrest s/p recent IMI.   2. Patent mid-RCA stent: OCT performed to rule out occult thrombosis or edge dissection--no stent thrombosis or dissection, however stent from 2 days prior grossly malapposed mid-distally, so decsion made to dilate the malapposed portion of the stent with a 4.0mm NC balloon at 10-20: repeat OCT showed adequate stent apposition throughout.     Echo 11/29/17:   1. The left ventricular systolic function is normal with a 60% estimated ejection fraction.   2. Spectral Doppler shows an impaired relaxation pattern of left ventricular diastolic filling.   3. Mild to moderate mitral valve regurgitation.   4. There is mild tricuspid regurgitation.     Cath 11/28/17:    1. STEMI with mid RCA 99% culprit lesion s/p successful PCI with Resolute Onxy 3.0 x 22mm ANDREW, postdilated with 3.5mm NC balloon at 20 km.   2. LVEDP=10.   3. No significant LV-AO peak to peak gradient.    Assessment/Plan   Very pleasant 64 year-old female with history of inferior MI status post PCI to RCA culprit in November 2017, dyslipidemia and hypertension.  Overall doing well from a cardiac standpoint at this time, blood pressure well-controlled.    Plan:  -Continue aspirin 81 mg daily indefinitely and current rosuvastatin 40 mg daily  -Continue current amlodipine, Toprol-XL, valsartan-HCTZ  -Is going to possibly undergo " shoulder surgery--if surgery is indicated, may proceed to the OR at low risk for perioperative cardiac events.  Aspirin cannot be discontinued in the perioperative setting with her history of PCI.  -Follow-up in 1 year or sooner if needed      Garrison Bellamy MD

## 2025-01-30 LAB
ATRIAL RATE: 68 BPM
P AXIS: 50 DEGREES
P OFFSET: 190 MS
P ONSET: 139 MS
PR INTERVAL: 168 MS
Q ONSET: 223 MS
QRS COUNT: 11 BEATS
QRS DURATION: 82 MS
QT INTERVAL: 408 MS
QTC CALCULATION(BAZETT): 433 MS
QTC FREDERICIA: 425 MS
R AXIS: 46 DEGREES
T AXIS: -4 DEGREES
T OFFSET: 427 MS
VENTRICULAR RATE: 68 BPM

## 2025-01-31 ENCOUNTER — APPOINTMENT (OUTPATIENT)
Dept: PRIMARY CARE | Facility: CLINIC | Age: 65
End: 2025-01-31
Payer: COMMERCIAL

## 2025-01-31 VITALS
HEIGHT: 60 IN | BODY MASS INDEX: 27.09 KG/M2 | WEIGHT: 138 LBS | DIASTOLIC BLOOD PRESSURE: 62 MMHG | SYSTOLIC BLOOD PRESSURE: 114 MMHG

## 2025-01-31 DIAGNOSIS — Z12.11 ENCOUNTER FOR SCREENING COLONOSCOPY: ICD-10-CM

## 2025-01-31 DIAGNOSIS — R42 DIZZINESS: ICD-10-CM

## 2025-01-31 DIAGNOSIS — E11.69 TYPE 2 DIABETES MELLITUS WITH OTHER SPECIFIED COMPLICATION, WITHOUT LONG-TERM CURRENT USE OF INSULIN: ICD-10-CM

## 2025-01-31 DIAGNOSIS — E78.00 HYPERCHOLESTEROLEMIA: ICD-10-CM

## 2025-01-31 DIAGNOSIS — I10 BENIGN HYPERTENSION: ICD-10-CM

## 2025-01-31 PROCEDURE — 3008F BODY MASS INDEX DOCD: CPT | Performed by: INTERNAL MEDICINE

## 2025-01-31 PROCEDURE — 3074F SYST BP LT 130 MM HG: CPT | Performed by: INTERNAL MEDICINE

## 2025-01-31 PROCEDURE — 3078F DIAST BP <80 MM HG: CPT | Performed by: INTERNAL MEDICINE

## 2025-01-31 PROCEDURE — 99204 OFFICE O/P NEW MOD 45 MIN: CPT | Performed by: INTERNAL MEDICINE

## 2025-01-31 ASSESSMENT — ENCOUNTER SYMPTOMS
DEPRESSION: 0
OCCASIONAL FEELINGS OF UNSTEADINESS: 0
LOSS OF SENSATION IN FEET: 0

## 2025-01-31 NOTE — PROGRESS NOTES
"Subjective   Patient ID: Livia Jordan is a 64 y.o. female who presents for New Patient Visit.    HPI   New patient visit  Follow-up diabetes hypertension high cholesterol coronary artery disease  On Ozempic for past 1 month  Complaining of feeling very dizzy lightheaded at times.  Denies vertigo  She follows up with Dr. Sapp for left shoulder pain    Past medical history: Hypertension, high cholesterol, coronary artery disease 2017, diabetes, cholelithiasis, fatty liver, left shoulder osteoarthritis, for , right carpal tunnel,  Social history: Non-smoker quit at 35 rarely drinks alcohol no drugs, raised 8 kids.  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   Constitutional:       Appearance: Normal appearance.   HENT:      Head: Normocephalic and atraumatic.      Right Ear: Tympanic membrane, ear canal and external ear normal.      Left Ear: Tympanic membrane, ear canal and external ear normal.      Nose: Nose normal.      Mouth/Throat:      Pharynx: Oropharynx is clear.   Eyes:      Extraocular Movements: Extraocular movements intact.      Conjunctiva/sclera: Conjunctivae normal.      Pupils: Pupils are equal, round, and reactive to light.   Cardiovascular:      Rate and Rhythm: Normal rate and regular rhythm.      Pulses: Normal pulses.      Heart sounds: Normal heart sounds.   Pulmonary:      Effort: Pulmonary effort is normal.      Breath sounds: Normal breath sounds.   Abdominal:      General: Abdomen is flat. Bowel sounds are normal.      Palpations: Abdomen is soft.   Musculoskeletal:      Cervical back: Normal range of motion and neck supple.   Skin:     General: Skin is warm and dry.   Neurological:      General: No focal deficit present.      Mental Status: She is alert and " oriented to person, place, and time.   Psychiatric:         Mood and Affect: Mood normal.       Assessment/Plan   Problem List Items Addressed This Visit             ICD-10-CM       Cardiac and Vasculature    Benign hypertension I10    Relevant Orders    CBC    Comprehensive Metabolic Panel    Hemoglobin A1C    Lipid Panel    Thyroid Stimulating Hormone    Vitamin D 25-Hydroxy,Total (for eval of Vitamin D levels)    Hypercholesterolemia E78.00    Relevant Orders    CBC    Comprehensive Metabolic Panel    Hemoglobin A1C    Lipid Panel    Thyroid Stimulating Hormone    Vitamin D 25-Hydroxy,Total (for eval of Vitamin D levels)     Other Visit Diagnoses         Codes    Encounter for screening colonoscopy     Z12.11    Relevant Orders    Cologuard® colon cancer screening    Dizziness     R42    Relevant Orders    Vascular US Carotid Artery Duplex Bilateral    Type 2 diabetes mellitus with other specified complication, without long-term current use of insulin     E11.69    Relevant Orders    CBC    Comprehensive Metabolic Panel    Hemoglobin A1C    Lipid Panel    Thyroid Stimulating Hormone    Vitamin D 25-Hydroxy,Total (for eval of Vitamin D levels)          Patient's old chart reviewed  Due for blood work  Blood work ordered  Will do ultrasound carotid for symptoms of dizziness  Patient is under care of cardiologist Cologuard ordered  Follow-up after blood work  Medications refilled for 6 months  Follow-up after 6 months blood work

## 2025-02-10 ENCOUNTER — APPOINTMENT (OUTPATIENT)
Dept: ORTHOPEDIC SURGERY | Facility: CLINIC | Age: 65
End: 2025-02-10
Payer: COMMERCIAL

## 2025-03-03 ENCOUNTER — HOSPITAL ENCOUNTER (OUTPATIENT)
Dept: RADIOLOGY | Facility: CLINIC | Age: 65
Discharge: HOME | End: 2025-03-03
Payer: COMMERCIAL

## 2025-03-03 ENCOUNTER — APPOINTMENT (OUTPATIENT)
Dept: ORTHOPEDIC SURGERY | Facility: CLINIC | Age: 65
End: 2025-03-03
Payer: COMMERCIAL

## 2025-03-03 DIAGNOSIS — G89.29 CHRONIC LEFT SHOULDER PAIN: ICD-10-CM

## 2025-03-03 DIAGNOSIS — M25.812 IMPINGEMENT OF LEFT SHOULDER: ICD-10-CM

## 2025-03-03 DIAGNOSIS — M75.22 BICEPS TENDINITIS OF LEFT SHOULDER: ICD-10-CM

## 2025-03-03 DIAGNOSIS — M25.512 ACUTE PAIN OF LEFT SHOULDER: Primary | ICD-10-CM

## 2025-03-03 DIAGNOSIS — M25.512 ACUTE PAIN OF LEFT SHOULDER: ICD-10-CM

## 2025-03-03 DIAGNOSIS — M25.512 CHRONIC LEFT SHOULDER PAIN: ICD-10-CM

## 2025-03-03 PROCEDURE — 20550 NJX 1 TENDON SHEATH/LIGAMENT: CPT | Performed by: ORTHOPAEDIC SURGERY

## 2025-03-03 PROCEDURE — 73030 X-RAY EXAM OF SHOULDER: CPT | Mod: LEFT SIDE | Performed by: RADIOLOGY

## 2025-03-03 PROCEDURE — 76942 ECHO GUIDE FOR BIOPSY: CPT | Performed by: ORTHOPAEDIC SURGERY

## 2025-03-03 PROCEDURE — 73030 X-RAY EXAM OF SHOULDER: CPT | Mod: LT

## 2025-03-03 PROCEDURE — 20611 DRAIN/INJ JOINT/BURSA W/US: CPT | Performed by: ORTHOPAEDIC SURGERY

## 2025-03-03 PROCEDURE — 99203 OFFICE O/P NEW LOW 30 MIN: CPT | Performed by: ORTHOPAEDIC SURGERY

## 2025-03-03 PROCEDURE — 1036F TOBACCO NON-USER: CPT | Performed by: ORTHOPAEDIC SURGERY

## 2025-03-03 RX ORDER — LIDOCAINE HYDROCHLORIDE 10 MG/ML
3 INJECTION, SOLUTION INFILTRATION; PERINEURAL
Status: COMPLETED | OUTPATIENT
Start: 2025-03-03 | End: 2025-03-03

## 2025-03-03 RX ORDER — METHYLPREDNISOLONE ACETATE 40 MG/ML
30 INJECTION, SUSPENSION INTRA-ARTICULAR; INTRALESIONAL; INTRAMUSCULAR; SOFT TISSUE
Status: COMPLETED | OUTPATIENT
Start: 2025-03-03 | End: 2025-03-03

## 2025-03-03 RX ORDER — LIDOCAINE HYDROCHLORIDE 10 MG/ML
2 INJECTION, SOLUTION INFILTRATION; PERINEURAL
Status: COMPLETED | OUTPATIENT
Start: 2025-03-03 | End: 2025-03-03

## 2025-03-03 RX ORDER — TRIAMCINOLONE ACETONIDE 40 MG/ML
40 INJECTION, SUSPENSION INTRA-ARTICULAR; INTRAMUSCULAR
Status: COMPLETED | OUTPATIENT
Start: 2025-03-03 | End: 2025-03-03

## 2025-03-03 RX ADMIN — METHYLPREDNISOLONE ACETATE 30 MG: 40 INJECTION, SUSPENSION INTRA-ARTICULAR; INTRALESIONAL; INTRAMUSCULAR; SOFT TISSUE at 10:13

## 2025-03-03 RX ADMIN — LIDOCAINE HYDROCHLORIDE 3 ML: 10 INJECTION, SOLUTION INFILTRATION; PERINEURAL at 10:13

## 2025-03-03 RX ADMIN — LIDOCAINE HYDROCHLORIDE 2 ML: 10 INJECTION, SOLUTION INFILTRATION; PERINEURAL at 10:13

## 2025-03-03 RX ADMIN — TRIAMCINOLONE ACETONIDE 40 MG: 40 INJECTION, SUSPENSION INTRA-ARTICULAR; INTRAMUSCULAR at 10:13

## 2025-03-03 ASSESSMENT — ENCOUNTER SYMPTOMS
ARTHRALGIAS: 1
BRUISES/BLEEDS EASILY: 0
WHEEZING: 0
CHILLS: 0
FEVER: 0
FATIGUE: 0
SHORTNESS OF BREATH: 0

## 2025-03-03 ASSESSMENT — PAIN SCALES - GENERAL: PAINLEVEL_OUTOF10: 8

## 2025-03-03 NOTE — PROGRESS NOTES
Reason for Appointment  No chief complaint on file.    History of Present Illness  New patient is a 64 y.o. female here today for evaluation of left shoulder. PMHx includes HTN, CAD, T2DM. X-rays taken of the left shoulder on 3/3/25 were reviewed and showed mild ac joint and joint arthritis. Today she reports she has been using heat and cold for her shoulder pain. She believes her dog pulled her on her arm before thanksgiving and this is what caused her symtpoms. She has anterior pain that started radiating 2 months after the injury. No numbness. No neck pain. Pain is worse with overhead actvity. Pain bothers her at night. She has had a cortisone injection and her hand got swollen she couldn't use her hand for three days. She has had a shoulder and elbow injection before with no problems. No falls. Past medical history allergies medications social and family history all reviewed.     Past Medical History:   Diagnosis Date    Acute bronchitis, unspecified 2017    Bronchitis, acute    Calculus of gallbladder without cholecystitis without obstruction 2018    Cholelithiases    Encounter for screening mammogram for malignant neoplasm of breast 2016    Visit for screening mammogram    Hyperglycemia, unspecified 2022    Hyperglycemia    Other ventricular tachycardia 2017    Polymorphic ventricular tachycardia    Pain in right shoulder 2017    Right shoulder pain    Personal history of other infectious and parasitic diseases     History of chicken pox    Personal history of peptic ulcer disease 2015    History of peptic ulcer    Unspecified osteoarthritis, unspecified site 2017    Osteoarthritis       Past Surgical History:   Procedure Laterality Date    CARPAL TUNNEL RELEASE  2015    Neuroplasty Decompression Median Nerve At Carpal Tunnel     SECTION, CLASSIC  2016     Section    COLONOSCOPY  2016    Complete Colonoscopy    CORONARY  ANGIOPLASTY WITH STENT PLACEMENT  2017    Cath Stent Placement    OTHER SURGICAL HISTORY  2016    Fiberoptic Examinations Esophagoscopy    TONSILLECTOMY  2016    Tonsillectomy    TUBAL LIGATION  2016    Tubal Ligation       Medication Documentation Review Audit       Reviewed by Paul Cisneros MA (Medical Assistant) on 25 at 0957      Medication Order Taking? Sig Documenting Provider Last Dose Status   amLODIPine (Norvasc) 10 mg tablet [id number]  Take 1 tablet (10 mg) by mouth once daily. Lena Painting MD  Active   aspirin 81 mg EC tablet [id number]  Take 1 tablet (81 mg) by mouth once daily. Historical Provider, MD  Active   cholecalciferol (Vitamin D-3) 125 MCG (5000 UT) capsule [id number] No Take by mouth. Historical Provider, MD Taking Active   fluticasone (Flonase) 50 mcg/actuation nasal spray [id number]  Administer 1 spray into each nostril once daily. Lena Painting MD   25 2359   FreeStyle lancets 28 gauge [id number] No 1 each once daily. Use as instructed Lena Painting MD Taking Active   FreeStyle Lite Meter kit [id number] No 1 each once daily. Lena Painting MD Taking Active   FreeStyle Lite Strips strip [id number] No 1 strip once daily. Lena Painting MD Taking Active   loratadine (Claritin) 10 mg tablet [id number] No Take 1 tablet (10 mg) by mouth once daily. Historical Provider, MD Taking Active   metoprolol succinate XL (Toprol-XL) 25 mg 24 hr tablet [id number]  Take 1 tablet (25 mg) by mouth once daily. Lena Painting MD  Active   pantoprazole (ProtoNix) 40 mg EC tablet [id number]  Take 1 tablet (40 mg) by mouth once daily. Lena Painting MD  Active   rosuvastatin (Crestor) 40 mg tablet [id number]  Take 1 tablet (40 mg) by mouth once daily. Lena Painting MD  Active   semaglutide (Ozempic) 0.25 mg or 0.5 mg (2 mg/3 mL) pen injector [id number]  Inject 0.25 mg under the skin every 7 days. Lena Painting MD  Active    sertraline (Zoloft) 100 mg tablet [id number]  Take 1 tablet (100 mg) by mouth 2 times a day. Lena Painting MD  Active   valsartan-hydrochlorothiazide (Diovan-HCT) 160-12.5 mg tablet [id number]  Take 1 tablet by mouth once daily. Lena Painting MD  Active                    Allergies   Allergen Reactions    Amiodarone Itching and Rash     painful    Enalapril Maleate Cough       Review of Systems   Constitutional:  Negative for chills, fatigue and fever.   Respiratory:  Negative for shortness of breath and wheezing.    Cardiovascular:  Negative for chest pain and leg swelling.   Musculoskeletal:  Positive for arthralgias.   Allergic/Immunologic: Negative for immunocompromised state.   Hematological:  Does not bruise/bleed easily.       Exam   Pt is alert awake, orientated to person place and time. No acute distress. Mood is good. Good pulses and good sensation. Negative More's sign. No auditory wheezes. No JVD.  No hyperreflexia. No skin changes. Good capillary refill. Good cervical rom. No bony tenderness. Good forward flexion. 15 degrees loss of internal and extremal rotation. Cuff strength maintained but painful. Markedly positive impingement sign. Positive biceps tenderness. Good elbow wrist hand rom.     Assessment   Encounter Diagnoses   Name Primary?    Acute pain of left shoulder Yes    Chronic left shoulder pain    Left shoulder impingement   Left biceps tendinitis    Plan     Today we discussed conservative treatment. At this point, the patient is experiencing increased left shoulder pain that is consistent with impingement syndrome and biceps tendinitis on clinical examination. We will do one cortisone injection into the left subacromial space and biceps tendon sheath in hopes to calm their symptoms nicely. Pt understands the small risk of infection and warning signs including flare reaction. She can follow up in 6 weeks for a reevaluation.       Patient ID: Livia PEPPER Wandafina is a 64 y.o.  female.    L Inj/Asp: L subacromial bursa on 3/3/2025 10:13 AM  Indications: pain  Details: 22 G needle, ultrasound-guided  Medications: 40 mg triamcinolone acetonide 40 mg/mL; 3 mL lidocaine 10 mg/mL (1 %)  Outcome: tolerated well, no immediate complications    After discussing the risks and benefits of the procedure with proceeded with an injection.  Using ultrasound guidance we identified the acromion, humeral head and the subacromial bursa, images obtained and saved. We then sterilely injected the left subacrominal space with a mixture of 40 mg of Kenalog and 1 cc of 1 % lidocaine. Pt tolerated the procedure well without any adverse reactions.    Procedure, treatment alternatives, risks and benefits explained, specific risks discussed. Consent was given by the patient. Immediately prior to procedure a time out was called to verify the correct patient, procedure, equipment, support staff and site/side marked as required. Patient was prepped and draped in the usual sterile fashion.       Tendon Sheath Injection: left long head of biceps tendon sheath on 3/3/2025 10:13 AM  Indications: pain  Details: ultrasound-guided  Medications: 2 mL lidocaine 10 mg/mL (1 %); 30 mg methylPREDNISolone acetate 40 mg/mL  Outcome: tolerated well, no immediate complications    After discussing the risks and benefits of the procedure with proceeded with an injection. Using ultrasound guidance we identified the greater and lesser tuberosities and the biceps tendons sheath, images obtained and saved. We then sterilely injected the left biceps tendon sheath with a mixture of 30 mg of Depo-Medrol and 2 cc of 1 % lidocaine. Pt tolerated the procedure well without any adverse reactions    Procedure, treatment alternatives, risks and benefits explained, specific risks discussed. Consent was given by the patient. Immediately prior to procedure a time out was called to verify the correct patient, procedure, equipment, support staff and  site/side marked as required. Patient was prepped and draped in the usual sterile fashion.           I, Meaghan Javier, attest that this documentation has been prepared under the direction and in the presence of Troy Sapp MD.   By signing below, I, Troy Sapp MD, personally performed the services described in this documentation. All medical record entries made by the scribe were at my direction and in my presence. I have reviewed the chart and agree that the record reflects my personal performance and is accurate and complete.

## 2025-03-10 DIAGNOSIS — K21.9 GASTROESOPHAGEAL REFLUX DISEASE, UNSPECIFIED WHETHER ESOPHAGITIS PRESENT: ICD-10-CM

## 2025-03-10 DIAGNOSIS — I10 BENIGN ESSENTIAL HYPERTENSION: ICD-10-CM

## 2025-03-10 RX ORDER — METOPROLOL SUCCINATE 25 MG/1
25 TABLET, EXTENDED RELEASE ORAL DAILY
Qty: 90 TABLET | Refills: 0 | Status: SHIPPED | OUTPATIENT
Start: 2025-03-10

## 2025-03-10 RX ORDER — PANTOPRAZOLE SODIUM 40 MG/1
40 TABLET, DELAYED RELEASE ORAL DAILY
Qty: 90 TABLET | Refills: 0 | Status: SHIPPED | OUTPATIENT
Start: 2025-03-10

## 2025-04-14 ENCOUNTER — APPOINTMENT (OUTPATIENT)
Dept: ORTHOPEDIC SURGERY | Facility: CLINIC | Age: 65
End: 2025-04-14
Payer: COMMERCIAL

## 2025-04-23 DIAGNOSIS — J01.11 ACUTE RECURRENT FRONTAL SINUSITIS: ICD-10-CM

## 2025-04-23 DIAGNOSIS — J30.9 ALLERGIC RHINITIS, UNSPECIFIED SEASONALITY, UNSPECIFIED TRIGGER: ICD-10-CM

## 2025-04-23 DIAGNOSIS — E78.00 HYPERCHOLESTEROLEMIA: ICD-10-CM

## 2025-04-23 DIAGNOSIS — I10 BENIGN HYPERTENSION: ICD-10-CM

## 2025-04-23 RX ORDER — FLUTICASONE PROPIONATE 50 MCG
1 SPRAY, SUSPENSION (ML) NASAL DAILY
Qty: 48 G | Refills: 0 | Status: SHIPPED | OUTPATIENT
Start: 2025-04-23 | End: 2025-07-22

## 2025-04-23 RX ORDER — ROSUVASTATIN CALCIUM 40 MG/1
40 TABLET, COATED ORAL DAILY
Qty: 90 TABLET | Refills: 0 | Status: SHIPPED | OUTPATIENT
Start: 2025-04-23

## 2025-04-23 RX ORDER — VALSARTAN AND HYDROCHLOROTHIAZIDE 160; 12.5 MG/1; MG/1
1 TABLET, FILM COATED ORAL DAILY
Qty: 90 TABLET | Refills: 0 | Status: SHIPPED | OUTPATIENT
Start: 2025-04-23

## 2025-04-28 ENCOUNTER — APPOINTMENT (OUTPATIENT)
Dept: ORTHOPEDIC SURGERY | Facility: CLINIC | Age: 65
End: 2025-04-28
Payer: COMMERCIAL

## 2025-04-28 RX ORDER — AMLODIPINE BESYLATE 10 MG/1
10 TABLET ORAL DAILY
Qty: 90 TABLET | Refills: 0 | Status: SHIPPED | OUTPATIENT
Start: 2025-04-28

## 2025-06-16 DIAGNOSIS — K21.9 GASTROESOPHAGEAL REFLUX DISEASE, UNSPECIFIED WHETHER ESOPHAGITIS PRESENT: ICD-10-CM

## 2025-06-16 DIAGNOSIS — J30.9 ALLERGIC RHINITIS, UNSPECIFIED SEASONALITY, UNSPECIFIED TRIGGER: ICD-10-CM

## 2025-06-16 DIAGNOSIS — I10 BENIGN ESSENTIAL HYPERTENSION: ICD-10-CM

## 2025-06-16 DIAGNOSIS — J01.11 ACUTE RECURRENT FRONTAL SINUSITIS: ICD-10-CM

## 2025-06-16 RX ORDER — METOPROLOL SUCCINATE 25 MG/1
25 TABLET, EXTENDED RELEASE ORAL DAILY
Qty: 30 TABLET | Refills: 0 | Status: SHIPPED | OUTPATIENT
Start: 2025-06-16

## 2025-06-16 RX ORDER — PANTOPRAZOLE SODIUM 40 MG/1
40 TABLET, DELAYED RELEASE ORAL DAILY
Qty: 30 TABLET | Refills: 0 | Status: SHIPPED | OUTPATIENT
Start: 2025-06-16

## 2025-06-16 RX ORDER — FLUTICASONE PROPIONATE 50 MCG
1 SPRAY, SUSPENSION (ML) NASAL DAILY
Qty: 48 G | Refills: 0 | Status: SHIPPED | OUTPATIENT
Start: 2025-06-16 | End: 2025-09-14

## 2025-06-25 DIAGNOSIS — Z12.31 ENCOUNTER FOR SCREENING MAMMOGRAM FOR BREAST CANCER: ICD-10-CM

## 2025-06-30 ENCOUNTER — APPOINTMENT (OUTPATIENT)
Dept: ORTHOPEDIC SURGERY | Facility: CLINIC | Age: 65
End: 2025-06-30
Payer: COMMERCIAL

## 2025-06-30 DIAGNOSIS — M75.22 BICEPS TENDINITIS OF LEFT SHOULDER: Primary | ICD-10-CM

## 2025-06-30 DIAGNOSIS — R29.898 LEFT ARM WEAKNESS: ICD-10-CM

## 2025-06-30 DIAGNOSIS — M75.102 TEAR OF LEFT ROTATOR CUFF, UNSPECIFIED TEAR EXTENT, UNSPECIFIED WHETHER TRAUMATIC: ICD-10-CM

## 2025-06-30 DIAGNOSIS — M79.602 LEFT ARM PAIN: ICD-10-CM

## 2025-06-30 PROCEDURE — 99213 OFFICE O/P EST LOW 20 MIN: CPT | Performed by: ORTHOPAEDIC SURGERY

## 2025-06-30 PROCEDURE — 1036F TOBACCO NON-USER: CPT | Performed by: ORTHOPAEDIC SURGERY

## 2025-06-30 ASSESSMENT — ENCOUNTER SYMPTOMS
ARTHRALGIAS: 1
BRUISES/BLEEDS EASILY: 0
FEVER: 0
CHILLS: 0
FATIGUE: 0
SHORTNESS OF BREATH: 0
WHEEZING: 0
WEAKNESS: 1

## 2025-06-30 ASSESSMENT — PAIN SCALES - GENERAL: PAINLEVEL_OUTOF10: 7

## 2025-06-30 ASSESSMENT — PAIN - FUNCTIONAL ASSESSMENT: PAIN_FUNCTIONAL_ASSESSMENT: 0-10

## 2025-06-30 NOTE — PROGRESS NOTES
Reason for Appointment  Chief Complaint   Patient presents with    Left Shoulder - Pain     History of Present Illness  Patient is a 64 y.o. female here today for follow-up evaluation of left shoulder pain. PMHx includes HTN, CAD, T2DM. X-rays taken of the left shoulder on 3/3/25 were reviewed and showed mild ac joint and joint arthritis. We last saw the patient on 3/3/25 and we gave a left biceps and subacromial injection. Today she reports she got great relief from the injection, short term relief. Pain is back today. Pain is the same as before, no improvement. Anterior and anterior lateral shoulder pain. Pain with elevation and overhead activities. No recent falls or injuries. No other changes in past medical history, allergies, or medications.      Medical History[1]    Surgical History[2]    Medication Documentation Review Audit       Reviewed by Jovanna Donato MA (Medical Assistant) on 25 at 0837      Medication Order Taking? Sig Documenting Provider Last Dose Status   amLODIPine (Norvasc) 10 mg tablet [id number] Yes Take 1 tablet (10 mg) by mouth once daily. Maddy Colbert MD  Active   aspirin 81 mg EC tablet [id number] Yes Take 1 tablet (81 mg) by mouth once daily. Historical Provider, MD  Active   cholecalciferol (Vitamin D-3) 125 MCG (5000 UT) capsule [id number] Yes Take by mouth. Historical Provider, MD  Active   fluticasone (Flonase) 50 mcg/actuation nasal spray [id number] Yes Administer 1 spray into each nostril once daily. Maddy Colbert MD  Active   FreeStyle lancets 28 gauge [id number] Yes 1 each once daily. Use as instructed Lena Painting MD  Active   FreeStyle Lite Meter kit [id number]  1 each once daily. Lena Painting MD   25 2359   FreeStyle Lite Strips strip [id number] Yes 1 strip once daily. Lena Painting MD  Active   loratadine (Claritin) 10 mg tablet [id number] Yes Take 1 tablet (10 mg) by mouth once daily. Historical Provider, MD  Active   metoprolol succinate XL  (Toprol-XL) 25 mg 24 hr tablet [id number] Yes Take 1 tablet (25 mg) by mouth once daily. Maddy Colbert MD  Active   pantoprazole (ProtoNix) 40 mg EC tablet [id number] Yes Take 1 tablet (40 mg) by mouth once daily. Maddy Colbert MD  Active   rosuvastatin (Crestor) 40 mg tablet [id number] Yes Take 1 tablet (40 mg) by mouth once daily. Maddy Colbert MD  Active   semaglutide (Ozempic) 0.25 mg or 0.5 mg (2 mg/3 mL) pen injector [id number] Yes Inject 0.25 mg under the skin every 7 days. Lena Painting MD  Active   sertraline (Zoloft) 100 mg tablet [id number] Yes Take 1 tablet (100 mg) by mouth 2 times a day. Lena Painting MD  Active   valsartan-hydrochlorothiazide (Diovan-HCT) 160-12.5 mg tablet [id number] Yes Take 1 tablet by mouth once daily. Maddy Colbert MD  Active                    RX Allergies[3]    Review of Systems   Constitutional:  Negative for chills, fatigue and fever.   Respiratory:  Negative for shortness of breath and wheezing.    Cardiovascular:  Negative for chest pain and leg swelling.   Musculoskeletal:  Positive for arthralgias.   Allergic/Immunologic: Negative for immunocompromised state.   Neurological:  Positive for weakness.   Hematological:  Does not bruise/bleed easily.       Exam   Good pulses and sensation. Weakness in external rotation. Markedly positive impingement sign.     Assessment   Left rotator cuff tear  Left biceps tendinitis     Plan       With her continues pain, weakness on clinical exanimation today, and Also sig biceps tenderness we discussed an MRI. Today we discussed conservative treatment. At this point, the patient is experiencing severe pain in the left shoulder with significant lack of function on a daily basis. Patient has tried multiple conservative modalities such as anti-inflammatory medications and home therapy exercises with no significant improvement in terms of pain levels. I will order a MRI of the left shoulder to evaluate for left rotator cuff tear, as the  clinical examination and X-ray findings have indications that the patient's condition may warrant surgical intervention. Patient will follow-up after completion of the MRI.      I, Meaghan Javier, attest that this documentation has been prepared under the direction and in the presence of Troy Sapp MD.   By signing below, I, Troy Sapp MD, personally performed the services described in this documentation. All medical record entries made by the scribe were at my direction and in my presence. I have reviewed the chart and agree that the record reflects my personal performance and is accurate and complete.           [1]   Past Medical History:  Diagnosis Date    Acute bronchitis, unspecified 2017    Bronchitis, acute    Calculus of gallbladder without cholecystitis without obstruction 2018    Cholelithiases    Encounter for screening mammogram for malignant neoplasm of breast 2016    Visit for screening mammogram    Hyperglycemia, unspecified 2022    Hyperglycemia    Other ventricular tachycardia 2017    Polymorphic ventricular tachycardia    Pain in right shoulder 2017    Right shoulder pain    Personal history of other infectious and parasitic diseases     History of chicken pox    Personal history of peptic ulcer disease 2015    History of peptic ulcer    Unspecified osteoarthritis, unspecified site 2017    Osteoarthritis   [2]   Past Surgical History:  Procedure Laterality Date    CARPAL TUNNEL RELEASE  2015    Neuroplasty Decompression Median Nerve At Carpal Tunnel     SECTION, CLASSIC  2016     Section    COLONOSCOPY  2016    Complete Colonoscopy    CORONARY ANGIOPLASTY WITH STENT PLACEMENT  2017    Cath Stent Placement    OTHER SURGICAL HISTORY  2016    Fiberoptic Examinations Esophagoscopy    TONSILLECTOMY  2016    Tonsillectomy    TUBAL LIGATION  2016    Tubal Ligation   [3]   Allergies  Allergen  Reactions    Amiodarone Itching and Rash     painful    Enalapril Maleate Cough

## 2025-07-17 LAB
25(OH)D3+25(OH)D2 SERPL-MCNC: 53 NG/ML (ref 30–100)
ALBUMIN SERPL-MCNC: 4.6 G/DL (ref 3.6–5.1)
ALP SERPL-CCNC: 87 U/L (ref 37–153)
ALT SERPL-CCNC: 40 U/L (ref 6–29)
ANION GAP SERPL CALCULATED.4IONS-SCNC: 10 MMOL/L (CALC) (ref 7–17)
AST SERPL-CCNC: 35 U/L (ref 10–35)
BILIRUB SERPL-MCNC: 0.8 MG/DL (ref 0.2–1.2)
BUN SERPL-MCNC: 16 MG/DL (ref 7–25)
CALCIUM SERPL-MCNC: 9.8 MG/DL (ref 8.6–10.4)
CHLORIDE SERPL-SCNC: 103 MMOL/L (ref 98–110)
CHOLEST SERPL-MCNC: 166 MG/DL
CHOLEST/HDLC SERPL: 3 (CALC)
CO2 SERPL-SCNC: 29 MMOL/L (ref 20–32)
CREAT SERPL-MCNC: 0.76 MG/DL (ref 0.5–1.05)
EGFRCR SERPLBLD CKD-EPI 2021: 87 ML/MIN/1.73M2
ERYTHROCYTE [DISTWIDTH] IN BLOOD BY AUTOMATED COUNT: 13.9 % (ref 11–15)
EST. AVERAGE GLUCOSE BLD GHB EST-MCNC: 137 MG/DL
EST. AVERAGE GLUCOSE BLD GHB EST-SCNC: 7.6 MMOL/L
GLUCOSE SERPL-MCNC: 166 MG/DL (ref 65–99)
HBA1C MFR BLD: 6.4 %
HCT VFR BLD AUTO: 40.2 % (ref 35–45)
HDLC SERPL-MCNC: 55 MG/DL
HGB BLD-MCNC: 13.5 G/DL (ref 11.7–15.5)
LDLC SERPL CALC-MCNC: 80 MG/DL (CALC)
MCH RBC QN AUTO: 29.5 PG (ref 27–33)
MCHC RBC AUTO-ENTMCNC: 33.6 G/DL (ref 32–36)
MCV RBC AUTO: 88 FL (ref 80–100)
NONHDLC SERPL-MCNC: 111 MG/DL (CALC)
PLATELET # BLD AUTO: 183 THOUSAND/UL (ref 140–400)
PMV BLD REES-ECKER: 10.5 FL (ref 7.5–12.5)
POTASSIUM SERPL-SCNC: 3.7 MMOL/L (ref 3.5–5.3)
PROT SERPL-MCNC: 7.3 G/DL (ref 6.1–8.1)
RBC # BLD AUTO: 4.57 MILLION/UL (ref 3.8–5.1)
SODIUM SERPL-SCNC: 142 MMOL/L (ref 135–146)
TRIGL SERPL-MCNC: 223 MG/DL
TSH SERPL-ACNC: 2.27 MIU/L (ref 0.4–4.5)
WBC # BLD AUTO: 6.4 THOUSAND/UL (ref 3.8–10.8)

## 2025-07-25 ENCOUNTER — APPOINTMENT (OUTPATIENT)
Dept: PRIMARY CARE | Facility: CLINIC | Age: 65
End: 2025-07-25
Payer: COMMERCIAL

## 2025-07-25 VITALS
HEIGHT: 59 IN | BODY MASS INDEX: 28.22 KG/M2 | WEIGHT: 140 LBS | SYSTOLIC BLOOD PRESSURE: 120 MMHG | DIASTOLIC BLOOD PRESSURE: 66 MMHG

## 2025-07-25 DIAGNOSIS — K21.9 GASTROESOPHAGEAL REFLUX DISEASE, UNSPECIFIED WHETHER ESOPHAGITIS PRESENT: ICD-10-CM

## 2025-07-25 DIAGNOSIS — J01.11 ACUTE RECURRENT FRONTAL SINUSITIS: ICD-10-CM

## 2025-07-25 DIAGNOSIS — E11.69 TYPE 2 DIABETES MELLITUS WITH OTHER SPECIFIED COMPLICATION, WITHOUT LONG-TERM CURRENT USE OF INSULIN: ICD-10-CM

## 2025-07-25 DIAGNOSIS — F32.A DEPRESSION, UNSPECIFIED DEPRESSION TYPE: ICD-10-CM

## 2025-07-25 DIAGNOSIS — I10 BENIGN ESSENTIAL HYPERTENSION: ICD-10-CM

## 2025-07-25 DIAGNOSIS — E78.00 HYPERCHOLESTEROLEMIA: ICD-10-CM

## 2025-07-25 DIAGNOSIS — E78.00 HYPERCHOLESTEREMIA: ICD-10-CM

## 2025-07-25 DIAGNOSIS — I10 BENIGN HYPERTENSION: ICD-10-CM

## 2025-07-25 DIAGNOSIS — J30.9 ALLERGIC RHINITIS, UNSPECIFIED SEASONALITY, UNSPECIFIED TRIGGER: ICD-10-CM

## 2025-07-25 PROCEDURE — 3074F SYST BP LT 130 MM HG: CPT | Performed by: INTERNAL MEDICINE

## 2025-07-25 PROCEDURE — 3078F DIAST BP <80 MM HG: CPT | Performed by: INTERNAL MEDICINE

## 2025-07-25 PROCEDURE — 3008F BODY MASS INDEX DOCD: CPT | Performed by: INTERNAL MEDICINE

## 2025-07-25 PROCEDURE — 99214 OFFICE O/P EST MOD 30 MIN: CPT | Performed by: INTERNAL MEDICINE

## 2025-07-25 RX ORDER — AMLODIPINE BESYLATE 10 MG/1
10 TABLET ORAL DAILY
Qty: 90 TABLET | Refills: 0 | Status: SHIPPED | OUTPATIENT
Start: 2025-07-25

## 2025-07-25 RX ORDER — MULTIVITAMIN
1 TABLET ORAL
COMMUNITY

## 2025-07-25 RX ORDER — FLUTICASONE PROPIONATE 50 MCG
1 SPRAY, SUSPENSION (ML) NASAL DAILY
Qty: 48 G | Refills: 2 | Status: SHIPPED | OUTPATIENT
Start: 2025-07-25 | End: 2025-10-23

## 2025-07-25 RX ORDER — PANTOPRAZOLE SODIUM 40 MG/1
40 TABLET, DELAYED RELEASE ORAL DAILY
Qty: 90 TABLET | Refills: 0 | Status: SHIPPED | OUTPATIENT
Start: 2025-07-25

## 2025-07-25 RX ORDER — ROSUVASTATIN CALCIUM 40 MG/1
40 TABLET, COATED ORAL DAILY
Qty: 90 TABLET | Refills: 0 | Status: SHIPPED | OUTPATIENT
Start: 2025-07-25

## 2025-07-25 RX ORDER — SERTRALINE HYDROCHLORIDE 100 MG/1
100 TABLET, FILM COATED ORAL 2 TIMES DAILY
Qty: 180 TABLET | Refills: 0 | Status: SHIPPED | OUTPATIENT
Start: 2025-07-25

## 2025-07-25 RX ORDER — METOPROLOL SUCCINATE 25 MG/1
25 TABLET, EXTENDED RELEASE ORAL DAILY
Qty: 90 TABLET | Refills: 0 | Status: SHIPPED | OUTPATIENT
Start: 2025-07-25

## 2025-07-25 RX ORDER — VALSARTAN AND HYDROCHLOROTHIAZIDE 160; 12.5 MG/1; MG/1
1 TABLET, FILM COATED ORAL DAILY
Qty: 90 TABLET | Refills: 0 | Status: SHIPPED | OUTPATIENT
Start: 2025-07-25

## 2025-07-25 NOTE — PROGRESS NOTES
"Subjective   Patient ID: Livia Jordan is a 64 y.o. female who presents for Follow-up and Med Refill.    Patient is here for follow-up  Needs medication refills  Did blood work    Past recap    New patient visit  Follow-up diabetes hypertension high cholesterol coronary artery disease  On Ozempic for past 1 month  Complaining of feeling very dizzy lightheaded at times.  Denies vertigo  She follows up with Dr. Sapp for left shoulder pain    Past medical history: Hypertension, high cholesterol, coronary artery disease 2017, diabetes, cholelithiasis, fatty liver, left shoulder osteoarthritis, for , right carpal tunnel,  Social history: Non-smoker quit at 35 rarely drinks alcohol no drugs, raised 8 kids.  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   Constitutional:       Appearance: Normal appearance.   HENT:      Head: Normocephalic and atraumatic.      Right Ear: Tympanic membrane, ear canal and external ear normal.      Left Ear: Tympanic membrane, ear canal and external ear normal.      Nose: Nose normal.      Mouth/Throat:      Pharynx: Oropharynx is clear.     Eyes:      Extraocular Movements: Extraocular movements intact.      Conjunctiva/sclera: Conjunctivae normal.      Pupils: Pupils are equal, round, and reactive to light.       Cardiovascular:      Rate and Rhythm: Normal rate and regular rhythm.      Pulses: Normal pulses.      Heart sounds: Normal heart sounds.   Pulmonary:      Effort: Pulmonary effort is normal.      Breath sounds: Normal breath sounds.   Abdominal:      General: Abdomen is flat. Bowel sounds are normal.      Palpations: Abdomen is soft.     Musculoskeletal:      Cervical back: Normal range of motion and neck supple.     Skin:     General: Skin is warm and " dry.     Neurological:      General: No focal deficit present.      Mental Status: She is alert and oriented to person, place, and time.     Psychiatric:         Mood and Affect: Mood normal.         Assessment/Plan   Problem List Items Addressed This Visit           ICD-10-CM       Cardiac and Vasculature    Benign essential hypertension I10    Relevant Medications    amLODIPine (Norvasc) 10 mg tablet    metoprolol succinate XL (Toprol-XL) 25 mg 24 hr tablet    Benign hypertension I10    Relevant Medications    amLODIPine (Norvasc) 10 mg tablet    valsartan-hydrochlorothiazide (Diovan-HCT) 160-12.5 mg tablet    metoprolol succinate XL (Toprol-XL) 25 mg 24 hr tablet    Hypercholesterolemia E78.00    Relevant Medications    rosuvastatin (Crestor) 40 mg tablet       ENT    Acute frontal sinusitis J01.10    Relevant Medications    fluticasone (Flonase) 50 mcg/actuation nasal spray    Allergic rhinitis J30.9    Relevant Medications    fluticasone (Flonase) 50 mcg/actuation nasal spray       Gastrointestinal and Abdominal    Gastroesophageal reflux disease K21.9    Relevant Medications    amLODIPine (Norvasc) 10 mg tablet    fluticasone (Flonase) 50 mcg/actuation nasal spray    pantoprazole (ProtoNix) 40 mg EC tablet       Mental Health    Depression F32.A    Relevant Medications    sertraline (Zoloft) 100 mg tablet     Other Visit Diagnoses         Codes      Type 2 diabetes mellitus with other specified complication, without long-term current use of insulin     E11.69    Relevant Medications    rosuvastatin (Crestor) 40 mg tablet    Other Relevant Orders    Hemoglobin A1C    Referral to Nutrition Services      Hypercholesteremia     E78.00    Relevant Medications    rosuvastatin (Crestor) 40 mg tablet    Other Relevant Orders    Comprehensive Metabolic Panel    Lipid Panel        Past recap  Patient's old chart reviewed  Due for blood work  Blood work ordered  Will do ultrasound carotid for symptoms of  dizziness  Patient is under care of cardiologist Cologuard ordered  Follow-up after blood work  Medications refilled for 6 months  Follow-up after 6 months blood work    7/25/2025  Blood work reviewed  A1c has gone up to 6.4  LFT little elevated  Triglycerides high 223  Discussed diet and exercise  Refer to dietitian  Patient is not on diabetic medication  Discussed possibilities  Continue same medication for blood pressure and cholesterol  Follow-up blood work in 3 months  Discussed diet exercise losing weight

## 2025-08-12 ENCOUNTER — APPOINTMENT (OUTPATIENT)
Dept: ORTHOPEDIC SURGERY | Facility: CLINIC | Age: 65
End: 2025-08-12
Payer: COMMERCIAL

## 2025-10-24 ENCOUNTER — APPOINTMENT (OUTPATIENT)
Dept: PRIMARY CARE | Facility: CLINIC | Age: 65
End: 2025-10-24
Payer: COMMERCIAL

## 2026-01-27 ENCOUNTER — APPOINTMENT (OUTPATIENT)
Dept: CARDIOLOGY | Facility: CLINIC | Age: 66
End: 2026-01-27
Payer: COMMERCIAL